# Patient Record
Sex: FEMALE | Race: BLACK OR AFRICAN AMERICAN | NOT HISPANIC OR LATINO | Employment: UNEMPLOYED | URBAN - METROPOLITAN AREA
[De-identification: names, ages, dates, MRNs, and addresses within clinical notes are randomized per-mention and may not be internally consistent; named-entity substitution may affect disease eponyms.]

---

## 2019-09-23 DIAGNOSIS — R74.02 NONSPECIFIC ELEVATION OF LEVELS OF TRANSAMINASE OR LACTIC ACID DEHYDROGENASE (LDH): Primary | ICD-10-CM

## 2019-09-23 DIAGNOSIS — R74.01 NONSPECIFIC ELEVATION OF LEVELS OF TRANSAMINASE OR LACTIC ACID DEHYDROGENASE (LDH): Primary | ICD-10-CM

## 2019-09-23 DIAGNOSIS — Z12.39 SCREENING BREAST EXAMINATION: Primary | ICD-10-CM

## 2019-10-01 ENCOUNTER — HOSPITAL ENCOUNTER (OUTPATIENT)
Dept: RADIOLOGY | Facility: HOSPITAL | Age: 59
Discharge: HOME | End: 2019-10-01
Attending: FAMILY MEDICINE

## 2019-10-01 VITALS — WEIGHT: 220 LBS | HEIGHT: 66 IN | BODY MASS INDEX: 35.36 KG/M2

## 2019-10-01 DIAGNOSIS — R74.02 NONSPECIFIC ELEVATION OF LEVELS OF TRANSAMINASE OR LACTIC ACID DEHYDROGENASE (LDH): ICD-10-CM

## 2019-10-01 DIAGNOSIS — Z12.39 SCREENING BREAST EXAMINATION: ICD-10-CM

## 2019-10-01 DIAGNOSIS — R74.01 NONSPECIFIC ELEVATION OF LEVELS OF TRANSAMINASE OR LACTIC ACID DEHYDROGENASE (LDH): ICD-10-CM

## 2019-10-01 PROCEDURE — 76705 ECHO EXAM OF ABDOMEN: CPT | Mod: TC,PO

## 2019-10-01 PROCEDURE — 77067 SCR MAMMO BI INCL CAD: CPT | Mod: TC,PO

## 2019-10-04 DIAGNOSIS — R92.8 ABNORMAL MAMMOGRAM: Primary | ICD-10-CM

## 2019-10-07 ENCOUNTER — HOSPITAL ENCOUNTER (OUTPATIENT)
Dept: RADIOLOGY | Facility: HOSPITAL | Age: 59
Discharge: HOME | End: 2019-10-07
Attending: FAMILY MEDICINE

## 2019-10-07 DIAGNOSIS — R92.8 ABNORMAL MAMMOGRAM: ICD-10-CM

## 2019-10-07 PROCEDURE — 76642 ULTRASOUND BREAST LIMITED: CPT | Mod: TC,PO,RT

## 2019-11-11 ENCOUNTER — OFFICE VISIT (OUTPATIENT)
Dept: SURGERY | Facility: CLINIC | Age: 59
End: 2019-11-11
Payer: MEDICAID

## 2019-11-11 VITALS
WEIGHT: 220 LBS | TEMPERATURE: 98 F | HEIGHT: 66 IN | SYSTOLIC BLOOD PRESSURE: 125 MMHG | HEART RATE: 101 BPM | BODY MASS INDEX: 35.36 KG/M2 | DIASTOLIC BLOOD PRESSURE: 77 MMHG

## 2019-11-11 DIAGNOSIS — R92.8 ABNORMAL MAMMOGRAM OF RIGHT BREAST: Primary | ICD-10-CM

## 2019-11-11 PROCEDURE — 99213 OFFICE O/P EST LOW 20 MIN: CPT | Performed by: SURGERY

## 2019-11-11 PROCEDURE — 99203 PR OFFICE/OUTPT VISIT, NEW, LEVL III, 30-44 MIN: ICD-10-PCS | Mod: S$PBB,,, | Performed by: SURGERY

## 2019-11-11 PROCEDURE — 99203 OFFICE O/P NEW LOW 30 MIN: CPT | Mod: S$PBB,,, | Performed by: SURGERY

## 2019-11-11 RX ORDER — ROSUVASTATIN CALCIUM 10 MG/1
1 TABLET, COATED ORAL DAILY
Refills: 3 | COMMUNITY
Start: 2019-09-14

## 2019-11-11 RX ORDER — FLUOXETINE HYDROCHLORIDE 40 MG/1
1 CAPSULE ORAL DAILY
COMMUNITY

## 2019-11-11 RX ORDER — ALBUTEROL SULFATE 90 UG/1
2 AEROSOL, METERED RESPIRATORY (INHALATION) EVERY 4 HOURS PRN
COMMUNITY
Start: 2015-04-01

## 2019-11-11 RX ORDER — HYDROCHLOROTHIAZIDE 25 MG/1
1 TABLET ORAL DAILY
Refills: 3 | Status: ON HOLD | COMMUNITY
Start: 2019-09-09 | End: 2022-01-16 | Stop reason: HOSPADM

## 2019-11-11 RX ORDER — METFORMIN HYDROCHLORIDE 1000 MG/1
1 TABLET ORAL NIGHTLY
Refills: 3 | COMMUNITY
Start: 2019-09-14

## 2019-11-11 RX ORDER — LORAZEPAM 0.5 MG/1
0.5 TABLET ORAL 2 TIMES DAILY PRN
Refills: 3 | Status: ON HOLD | COMMUNITY
Start: 2019-10-23 | End: 2022-01-16 | Stop reason: HOSPADM

## 2019-11-11 RX ORDER — AMLODIPINE BESYLATE 10 MG/1
10 TABLET ORAL DAILY
COMMUNITY

## 2019-11-11 RX ORDER — OMEPRAZOLE 40 MG/1
1 CAPSULE, DELAYED RELEASE ORAL
Refills: 3 | COMMUNITY
Start: 2019-09-14

## 2019-11-11 NOTE — PROGRESS NOTES
Subjective:       Patient ID: Claire Mcnamara is a 59 y.o. female.    Chief Complaint: Consult ([facility] referred right breast bx )      HPI:  Patient presents for evaluation of nonpalpable right breast mass.  Mass was initially noted on screening mammogram and confirmed on ultrasound.  It is 1.6 cm in size and is interpreted as highly suspicious.  BI-RADS 5.    Patient never had breast issues in the past.  Patient has very strong family history of breast cancer.  She has multiple sisters to have breast cancer.  Patient has had children.  She is postmenopausal.  She is not taking hormone replacement.    Past Medical History:   Diagnosis Date    Anxiety     Depression     Diabetes mellitus, type 2     GERD (gastroesophageal reflux disease)     Hyperlipidemia     Hypertension      History reviewed. No pertinent surgical history.  Review of patient's allergies indicates:   Allergen Reactions    Amoxicillin Nausea Only and Other (See Comments)     Medication List with Changes/Refills   Current Medications    ALBUTEROL (PROVENTIL HFA) 90 MCG/ACTUATION INHALER    Proventil HFA 90 mcg/actuation aerosol inhaler   Inhale 2 puffs every 4 hours by inhalation route as needed for SOB for 30 days.    AMLODIPINE (NORVASC) 10 MG TABLET    Take 10 mg by mouth once daily.    FLUOXETINE 40 MG CAPSULE    Take 1 capsule by mouth once daily.    HYDROCHLOROTHIAZIDE (HYDRODIURIL) 25 MG TABLET    Take 1 tablet by mouth once daily.    LORAZEPAM (ATIVAN) 0.5 MG TABLET    Take 1 tablet by mouth 2 (two) times daily as needed.    METFORMIN (GLUCOPHAGE) 1000 MG TABLET    Take 1 tablet by mouth every evening.    OMEPRAZOLE (PRILOSEC) 40 MG CAPSULE    Take 1 capsule by mouth before breakfast.    ROSUVASTATIN (CRESTOR) 10 MG TABLET    Take 1 tablet by mouth once daily.     Family History   Problem Relation Age of Onset    Breast cancer Sister     Breast cancer Maternal Aunt     Breast cancer Other     Pancreatic cancer Mother      Social  History     Socioeconomic History    Marital status:      Spouse name: Not on file    Number of children: Not on file    Years of education: Not on file    Highest education level: Not on file   Occupational History    Not on file   Social Needs    Financial resource strain: Not on file    Food insecurity:     Worry: Not on file     Inability: Not on file    Transportation needs:     Medical: Not on file     Non-medical: Not on file   Tobacco Use    Smoking status: Current Some Day Smoker     Types: Cigarettes    Smokeless tobacco: Never Used   Substance and Sexual Activity    Alcohol use: Yes     Comment: Socially     Drug use: Never    Sexual activity: Not on file   Lifestyle    Physical activity:     Days per week: Not on file     Minutes per session: Not on file    Stress: Not on file   Relationships    Social connections:     Talks on phone: Not on file     Gets together: Not on file     Attends Orthodox service: Not on file     Active member of club or organization: Not on file     Attends meetings of clubs or organizations: Not on file     Relationship status: Not on file   Other Topics Concern    Not on file   Social History Narrative    Not on file         Review of Systems   Constitutional: Negative for appetite change, chills, fever and unexpected weight change.   HENT: Negative for hearing loss, rhinorrhea, sore throat and voice change.    Eyes: Negative for photophobia and visual disturbance.   Respiratory: Negative for cough, choking and shortness of breath.    Cardiovascular: Negative for chest pain, palpitations and leg swelling.   Gastrointestinal: Negative for abdominal pain, blood in stool, constipation, diarrhea, nausea and vomiting.   Endocrine: Negative for cold intolerance, heat intolerance, polydipsia and polyuria.   Musculoskeletal: Negative for arthralgias, back pain, joint swelling and neck stiffness.   Skin: Negative for color change, pallor and rash.    Neurological: Negative for dizziness, seizures, syncope and headaches.   Hematological: Negative for adenopathy. Does not bruise/bleed easily.   Psychiatric/Behavioral: Negative for agitation, behavioral problems and confusion.       Objective:      Physical Exam   Constitutional: She appears well-developed and well-nourished.  Non-toxic appearance. No distress.   HENT:   Head: Normocephalic and atraumatic. Head is without abrasion and without laceration.   Right Ear: External ear normal.   Left Ear: External ear normal.   Nose: Nose normal.   Mouth/Throat: Oropharynx is clear and moist.   Eyes: Pupils are equal, round, and reactive to light. EOM are normal.   Neck: Trachea normal. No tracheal deviation and normal range of motion present. No thyroid mass and no thyromegaly present.   Cardiovascular: Normal rate and regular rhythm.   Pulmonary/Chest: Effort normal. No accessory muscle usage. No tachypnea. No respiratory distress. Right breast exhibits no inverted nipple, no mass and no skin change. Left breast exhibits no inverted nipple, no mass and no skin change. No breast tenderness or discharge. Breasts are symmetrical.   Abdominal: Soft. Normal appearance and bowel sounds are normal. She exhibits no distension and no mass. There is no hepatosplenomegaly. There is no tenderness. There is no tenderness at McBurney's point and negative Ying's sign. No hernia.   Lymphadenopathy:     She has no cervical adenopathy.     She has no axillary adenopathy.        Right: No inguinal adenopathy present.        Left: No inguinal adenopathy present.   Neurological: She is alert. Coordination and gait normal.   Skin: Skin is warm and intact.   Psychiatric: She has a normal mood and affect. Her speech is normal and behavior is normal.       Assessment/Plan:   Abnormal mammogram of right breast  -     US Breast Biopsy with Imaging 1st site Right; Future        Impression and Treatment Plan:  Suspicious right breast mass.   Mildly enlarged nodes seen in the right axilla although I cannot feel them clinically.  Biopsy has been ordered.  We will base further decision making on biopsy results.  Very suspicious case.          Follow up in about 2 weeks (around 11/25/2019).

## 2019-11-11 NOTE — LETTER
November 11, 2019      Luis Manuel Montalvo DO  [address]  [facility]  [place] [address]           [facility]  [address]  [place] [address]  Phone: [phone]  Fax: [phone]          Patient: Claire Mcnamara   MR Number: [id number]   YOB: 1960   Date of Visit: 11/11/2019       Dear Dr. Luis Manuel Montalvo:    Thank you for referring Claire Mcnamara to me for evaluation. Attached you will find relevant portions of my assessment and plan of care.    If you have questions, please do not hesitate to call me. I look forward to following Claire Mcnamara along with you.    Sincerely,    Luis Manuel Rios MD    Enclosure  CC:  No Recipients    If you would like to receive this communication electronically, please contact [email or web address] or [phone] to request more information on [employer] Link access.    For providers and/or their staff who would like to refer a patient to [facility], please contact us through our one-stop-shop provider referral line, [facility], at [phone].    If you feel you have received this communication in error or would no longer like to receive these types of communications, please e-mail [email or web address]

## 2019-11-20 ENCOUNTER — HOSPITAL ENCOUNTER (OUTPATIENT)
Dept: RADIOLOGY | Facility: HOSPITAL | Age: 59
Discharge: HOME | End: 2019-11-20
Attending: SURGERY
Payer: MEDICAID

## 2019-11-20 DIAGNOSIS — R92.8 ABNORMAL MAMMOGRAM OF RIGHT BREAST: ICD-10-CM

## 2019-11-20 PROCEDURE — 27000342 US BREAST BIOPSY WITH IMAGING 1ST SITE RIGHT: Mod: PO

## 2019-11-20 NOTE — PLAN OF CARE
Procedure completed. Pt davy well.  Band aid to right breast.  No bleeding or swelling noted.  D/c instructions given, verbalized understanding. Pt amb out of dept

## 2019-12-06 ENCOUNTER — OFFICE VISIT (OUTPATIENT)
Dept: SURGERY | Facility: CLINIC | Age: 59
End: 2019-12-06
Payer: MEDICAID

## 2019-12-06 VITALS
DIASTOLIC BLOOD PRESSURE: 98 MMHG | HEART RATE: 90 BPM | SYSTOLIC BLOOD PRESSURE: 162 MMHG | WEIGHT: 220 LBS | TEMPERATURE: 98 F | BODY MASS INDEX: 35.36 KG/M2 | HEIGHT: 66 IN

## 2019-12-06 DIAGNOSIS — C50.211 MALIGNANT NEOPLASM OF UPPER-INNER QUADRANT OF RIGHT FEMALE BREAST, UNSPECIFIED ESTROGEN RECEPTOR STATUS: Primary | ICD-10-CM

## 2019-12-06 PROCEDURE — 99214 PR OFFICE/OUTPT VISIT, EST, LEVL IV, 30-39 MIN: ICD-10-PCS | Mod: S$PBB,,, | Performed by: SURGERY

## 2019-12-06 PROCEDURE — 99213 OFFICE O/P EST LOW 20 MIN: CPT | Performed by: SURGERY

## 2019-12-06 PROCEDURE — 99214 OFFICE O/P EST MOD 30 MIN: CPT | Mod: S$PBB,,, | Performed by: SURGERY

## 2019-12-06 NOTE — PROGRESS NOTES
Patient presents to discuss results of her right breast biopsy.  Unfortunately came back positive for breast cancer.  Treatment and staging of breast cancer along with options of mastectomy versus breast conservation discussed with patient in detail.  At this time patient is leaning towards breast conservation therapy.  We are going to get a preoperative oncology consultation and see patient back in 2 weeks at which time we will continue our discussion and decide definitively on our surgical approach.  Breast packet given to the patient.

## 2019-12-06 NOTE — LETTER
December 6, 2019      Luis Manuel Montalvo DO  [address]  [facility]  [place] [address]           [facility]  [address]  [place] [address]  Phone: [phone]  Fax: [phone]          Patient: Claire Mcnamara   MR Number: [id number]   YOB: 1960   Date of Visit: 12/6/2019       Dear Dr. Luis Manuel Montalvo:    Thank you for referring Claire Mcnamara to me for evaluation. Attached you will find relevant portions of my assessment and plan of care.    If you have questions, please do not hesitate to call me. I look forward to following Claire Mcnamara along with you.    Sincerely,    Luis Manuel Rios MD    Enclosure  CC:  No Recipients    If you would like to receive this communication electronically, please contact [email or web address] or [phone] to request more information on "[employer] Link access.    For providers and/or their staff who would like to refer a patient to [facility], please contact us through our one-stop-shop provider referral line, [facility], at [phone].    If you feel you have received this communication in error or would no longer like to receive these types of communications, please e-mail [email or web address]

## 2019-12-19 PROBLEM — C50.111 MALIGNANT NEOPLASM OF CENTRAL PORTION OF RIGHT BREAST IN FEMALE, ESTROGEN RECEPTOR POSITIVE: Status: ACTIVE | Noted: 2019-12-19

## 2019-12-19 PROBLEM — Z17.0 MALIGNANT NEOPLASM OF CENTRAL PORTION OF RIGHT BREAST IN FEMALE, ESTROGEN RECEPTOR POSITIVE: Status: ACTIVE | Noted: 2019-12-19

## 2019-12-20 ENCOUNTER — OFFICE VISIT (OUTPATIENT)
Dept: HEMATOLOGY/ONCOLOGY | Facility: CLINIC | Age: 59
End: 2019-12-20
Payer: MEDICAID

## 2019-12-20 VITALS
HEART RATE: 92 BPM | DIASTOLIC BLOOD PRESSURE: 90 MMHG | TEMPERATURE: 98 F | SYSTOLIC BLOOD PRESSURE: 148 MMHG | HEIGHT: 67 IN | WEIGHT: 205.19 LBS | BODY MASS INDEX: 32.2 KG/M2

## 2019-12-20 DIAGNOSIS — Z17.0 MALIGNANT NEOPLASM OF CENTRAL PORTION OF RIGHT BREAST IN FEMALE, ESTROGEN RECEPTOR POSITIVE: ICD-10-CM

## 2019-12-20 DIAGNOSIS — C50.111 MALIGNANT NEOPLASM OF CENTRAL PORTION OF RIGHT BREAST IN FEMALE, ESTROGEN RECEPTOR POSITIVE: ICD-10-CM

## 2019-12-20 PROCEDURE — 99205 PR OFFICE/OUTPT VISIT, NEW, LEVL V, 60-74 MIN: ICD-10-PCS | Mod: S$GLB,,, | Performed by: INTERNAL MEDICINE

## 2019-12-20 PROCEDURE — 99205 OFFICE O/P NEW HI 60 MIN: CPT | Mod: S$GLB,,, | Performed by: INTERNAL MEDICINE

## 2019-12-20 NOTE — PROGRESS NOTES
INITIAL [facility] HEM/ONC CONSULTATION      Subjective:       Patient ID: Claire Mcnamara is a 59 y.o. female.    Chief Complaint: No chief complaint on file.      Patient is a 58yo female who was found to have an abnormality on screening mammogram which showed a 17mm mass in the right breast. Biopsy has been done which shows an ER/VA, HEr2 negative cancer.  Patient has not had surgery and is here for recommendations.      10/7/2019 mammogram:  17mm spiculated mass in right breast along the nipple line.     11/20/2019 Diagnosis via needle biopsy  Grade I, IDCA  ER: 99%, VA: 95%, Her 2 negative    Past Medical History:   Diagnosis Date    Anxiety     Depression     Diabetes mellitus, type 2     GERD (gastroesophageal reflux disease)     Hyperlipidemia     Hypertension        History reviewed. No pertinent surgical history.    Social History     Socioeconomic History    Marital status:      Spouse name: Not on file    Number of children: Not on file    Years of education: Not on file    Highest education level: Not on file   Occupational History    Not on file   Social Needs    Financial resource strain: Not on file    Food insecurity:     Worry: Not on file     Inability: Not on file    Transportation needs:     Medical: Not on file     Non-medical: Not on file   Tobacco Use    Smoking status: Current Some Day Smoker     Types: Cigarettes    Smokeless tobacco: Never Used   Substance and Sexual Activity    Alcohol use: Yes     Comment: Socially     Drug use: Never    Sexual activity: Not on file   Lifestyle    Physical activity:     Days per week: Not on file     Minutes per session: Not on file    Stress: Not on file   Relationships    Social connections:     Talks on phone: Not on file     Gets together: Not on file     Attends Restoration service: Not on file     Active member of club or organization: Not on file     Attends meetings of clubs or organizations: Not on file     Relationship  status: Not on file   Other Topics Concern    Not on file   Social History Narrative    Not on file       Family History   Problem Relation Age of Onset    Breast cancer Sister     Breast cancer Maternal Aunt     Breast cancer Other     Pancreatic cancer Mother        Review of patient's allergies indicates:   Allergen Reactions    Amoxicillin Nausea Only and Other (See Comments)       Current Outpatient Medications:     albuterol (PROVENTIL HFA) 90 mcg/actuation inhaler, Proventil HFA 90 mcg/actuation aerosol inhaler  Inhale 2 puffs every 4 hours by inhalation route as needed for SOB for 30 days., Disp: , Rfl:     amLODIPine (NORVASC) 10 MG tablet, Take 10 mg by mouth once daily., Disp: , Rfl:     FLUoxetine 40 MG capsule, Take 1 capsule by mouth once daily., Disp: , Rfl:     hydroCHLOROthiazide (HYDRODIURIL) 25 MG tablet, Take 1 tablet by mouth once daily., Disp: , Rfl: 3    LORazepam (ATIVAN) 0.5 MG tablet, Take 1 tablet by mouth 2 (two) times daily as needed., Disp: , Rfl: 3    metFORMIN (GLUCOPHAGE) 1000 MG tablet, Take 1 tablet by mouth every evening., Disp: , Rfl: 3    omeprazole (PRILOSEC) 40 MG capsule, Take 1 capsule by mouth before breakfast., Disp: , Rfl: 3    rosuvastatin (CRESTOR) 10 MG tablet, Take 1 tablet by mouth once daily., Disp: , Rfl: 3    All medications and past history have been reviewed.    Review of Systems   Constitutional: Negative for activity change, appetite change, diaphoresis, fatigue, fever and unexpected weight change.   HENT: Negative for congestion and hearing loss.    Eyes: Negative for visual disturbance.   Respiratory: Negative for cough, chest tightness and shortness of breath.    Cardiovascular: Negative for chest pain and leg swelling.   Gastrointestinal: Negative for abdominal pain, blood in stool, diarrhea, nausea and vomiting.   Endocrine: Negative for cold intolerance and heat intolerance.   Genitourinary: Negative for difficulty urinating, dysuria and  "hematuria.   Neurological: Negative for dizziness and headaches.   Hematological: Negative for adenopathy. Does not bruise/bleed easily.   Psychiatric/Behavioral: Negative for behavioral problems.       Objective:        BP (!) 148/90   Pulse 92   Temp 98.1 °F (36.7 °C) (Oral)   Resp (!) 0   Ht 5' 6.5" (1.689 m)   Wt 93.1 kg (205 lb 3.2 oz)   BMI 32.62 kg/m²     Physical Exam   Constitutional: She appears well-developed and well-nourished.   HENT:   Head: Normocephalic and atraumatic.   Right Ear: External ear normal.   Left Ear: External ear normal.   Mouth/Throat: Oropharynx is clear and moist.   Eyes: Pupils are equal, round, and reactive to light. Conjunctivae are normal.   Neck: No tracheal deviation present. No thyromegaly present.   Cardiovascular: Normal rate, regular rhythm and normal heart sounds.   Pulmonary/Chest: Effort normal and breath sounds normal.   Abdominal: Soft. Bowel sounds are normal. She exhibits no distension and no mass. There is no tenderness.   Musculoskeletal: She exhibits no edema.   Neurological:   Neuro intact througout   Skin: No rash noted.   Psychiatric: She has a normal mood and affect. Her behavior is normal. Judgment and thought content normal.         Lab  No results found for this or any previous visit (from the past 336 hour(s)).  CMP  Sodium   Date Value Ref Range Status   09/15/2008 136 136 - 145 mMol/l Final     Potassium   Date Value Ref Range Status   09/15/2008 3.9 3.5 - 5.1 mMol/l Final     Chloride   Date Value Ref Range Status   09/15/2008 100 95 - 110 mMol/l Final     CO2   Date Value Ref Range Status   09/15/2008 23 23.0 - 29.0 mEq/L Final     Glucose   Date Value Ref Range Status   09/15/2008 92 70 - 110 mg/dl Final     BUN, Bld   Date Value Ref Range Status   09/15/2008 10 6 - 20 mg/dl Final     Creatinine   Date Value Ref Range Status   09/15/2008 0.7 0.5 - 1.4 mg/dl Final     Calcium   Date Value Ref Range Status   09/15/2008 9.8 8.7 - 10.5 mg/dl Final "     Total Protein   Date Value Ref Range Status   09/15/2008 8.0 6.0 - 8.4 gm/dl Final     Albumin   Date Value Ref Range Status   09/15/2008 4.8 3.5 - 5.2 g/dl Final     Total Bilirubin   Date Value Ref Range Status   09/15/2008 0.5 0.1 - 1.0 mg/dl Final     Comment:     For infants and newborns, interpretation of results should be based  on gestational age, weight and in agreement with clinical  observations.  .  Premature Infant recommended reference ranges:  Up to 24 hours.............<8.0 mg/dl  Up to 48 hours............<12.0 mg/dl  3-5 days..................<15.0 mg/dl  6-29 days.................<15.0 mg/dl       Alkaline Phosphatase   Date Value Ref Range Status   09/15/2008 78 55 - 135 U/L Final     AST   Date Value Ref Range Status   09/15/2008 19 10 - 40 U/L Final     ALT   Date Value Ref Range Status   09/15/2008 24 10 - 44 U/L Final         Specimen (12h ago, onward)    None                All lab results and imaging results have been reviewed and discussed with the patient.     Assessment:       1. Malignant neoplasm of central portion of right breast in female, estrogen receptor positive      Problem List Items Addressed This Visit     Malignant neoplasm of central portion of right breast in female, estrogen receptor positive     I had a long discussion with ms. Mcnamara about this cancer and detailed the pathology report and discussed the logic behind decision making in her care.  I discussed that at this point I would recommend she proceed with surgery and can get lump/sln and radiation or mastectomy.  I discussed these surgeries in detail with her and that her path dictates she will need antiestrogen therapy.  I cannot yet make a decision regarding chemotherapy as she will likely need oncotype dx testing done and this was discussed as well.  I will have her back with me after surgery to further discuss.               Cancer Staging  No matching staging information was found for the patient.      Plan:          Follow up in about 4 weeks (around 1/17/2020).       The plan was discussed with the patient and all questions/concerns have been answered to the patient's satisfaction.

## 2019-12-20 NOTE — ASSESSMENT & PLAN NOTE
I had a long discussion with ms. Mcnamara about this cancer and detailed the pathology report and discussed the logic behind decision making in her care.  I discussed that at this point I would recommend she proceed with surgery and can get lump/sln and radiation or mastectomy.  I discussed these surgeries in detail with her and that her path dictates she will need antiestrogen therapy.  I cannot yet make a decision regarding chemotherapy as she will likely need oncotype dx testing done and this was discussed as well.  I will have her back with me after surgery to further discuss.

## 2019-12-23 ENCOUNTER — HOSPITAL ENCOUNTER (OUTPATIENT)
Dept: PREADMISSION TESTING | Facility: HOSPITAL | Age: 59
Discharge: HOME | End: 2019-12-23
Attending: SURGERY
Payer: MEDICAID

## 2019-12-23 ENCOUNTER — HOSPITAL ENCOUNTER (OUTPATIENT)
Dept: RADIOLOGY | Facility: HOSPITAL | Age: 59
Discharge: HOME | End: 2019-12-23
Attending: SURGERY
Payer: MEDICAID

## 2019-12-23 ENCOUNTER — OFFICE VISIT (OUTPATIENT)
Dept: SURGERY | Facility: CLINIC | Age: 59
End: 2019-12-23
Payer: MEDICAID

## 2019-12-23 VITALS
DIASTOLIC BLOOD PRESSURE: 92 MMHG | TEMPERATURE: 99 F | SYSTOLIC BLOOD PRESSURE: 147 MMHG | RESPIRATION RATE: 16 BRPM | HEART RATE: 90 BPM | WEIGHT: 202.56 LBS | HEIGHT: 66 IN | OXYGEN SATURATION: 97 % | BODY MASS INDEX: 32.55 KG/M2

## 2019-12-23 VITALS
DIASTOLIC BLOOD PRESSURE: 99 MMHG | TEMPERATURE: 98 F | WEIGHT: 205 LBS | HEIGHT: 67 IN | SYSTOLIC BLOOD PRESSURE: 157 MMHG | BODY MASS INDEX: 32.18 KG/M2 | HEART RATE: 101 BPM

## 2019-12-23 DIAGNOSIS — C50.211 MALIGNANT NEOPLASM OF UPPER-INNER QUADRANT OF RIGHT FEMALE BREAST, UNSPECIFIED ESTROGEN RECEPTOR STATUS: ICD-10-CM

## 2019-12-23 DIAGNOSIS — C50.211 MALIGNANT NEOPLASM OF UPPER-INNER QUADRANT OF RIGHT FEMALE BREAST, UNSPECIFIED ESTROGEN RECEPTOR STATUS: Primary | ICD-10-CM

## 2019-12-23 PROCEDURE — 93005 ELECTROCARDIOGRAM TRACING: CPT

## 2019-12-23 PROCEDURE — 99213 PR OFFICE/OUTPT VISIT, EST, LEVL III, 20-29 MIN: ICD-10-PCS | Mod: S$PBB,,, | Performed by: SURGERY

## 2019-12-23 PROCEDURE — 71046 X-RAY EXAM CHEST 2 VIEWS: CPT | Mod: TC

## 2019-12-23 PROCEDURE — 99215 OFFICE O/P EST HI 40 MIN: CPT | Mod: 25 | Performed by: SURGERY

## 2019-12-23 PROCEDURE — 99213 OFFICE O/P EST LOW 20 MIN: CPT | Mod: S$PBB,,, | Performed by: SURGERY

## 2019-12-23 RX ORDER — SODIUM CHLORIDE 9 MG/ML
INJECTION, SOLUTION INTRAVENOUS CONTINUOUS
Status: CANCELLED | OUTPATIENT
Start: 2019-12-23

## 2019-12-23 RX ORDER — LIDOCAINE HYDROCHLORIDE 10 MG/ML
1 INJECTION, SOLUTION EPIDURAL; INFILTRATION; INTRACAUDAL; PERINEURAL ONCE
Status: DISCONTINUED | OUTPATIENT
Start: 2019-12-23 | End: 2022-01-16 | Stop reason: HOSPADM

## 2019-12-23 RX ORDER — MONTELUKAST SODIUM 10 MG/1
10 TABLET ORAL DAILY
COMMUNITY
Start: 2019-12-08

## 2019-12-23 RX ORDER — CHOLECALCIFEROL (VITAMIN D3) 25 MCG
1000 TABLET ORAL DAILY
COMMUNITY

## 2019-12-23 RX ORDER — ASCORBIC ACID 500 MG
500 TABLET ORAL DAILY
COMMUNITY
End: 2022-01-12

## 2019-12-23 NOTE — H&P (VIEW-ONLY)
Subjective:       Patient ID: Claire Mcnamara is a 59 y.o. female.    Chief Complaint: Other (FU BR CA Discussion)      HPI:  Patient with biopsy-proven cancer in the right breast presents for definitive treatment.  The tumor measures 1.6 cm.  Is not palpable.  Preoperative oncology consultation has been completed.  Patient would like to proceed with breast preservation therapy.    Past Medical History:   Diagnosis Date    Anxiety     Depression     Diabetes mellitus, type 2     GERD (gastroesophageal reflux disease)     Hyperlipidemia     Hypertension      History reviewed. No pertinent surgical history.  Review of patient's allergies indicates:   Allergen Reactions    Amoxicillin Nausea Only and Other (See Comments)     Medication List with Changes/Refills   Current Medications    ALBUTEROL (PROVENTIL HFA) 90 MCG/ACTUATION INHALER    Proventil HFA 90 mcg/actuation aerosol inhaler   Inhale 2 puffs every 4 hours by inhalation route as needed for SOB for 30 days.    AMLODIPINE (NORVASC) 10 MG TABLET    Take 10 mg by mouth once daily.    FLUOXETINE 40 MG CAPSULE    Take 1 capsule by mouth once daily.    HYDROCHLOROTHIAZIDE (HYDRODIURIL) 25 MG TABLET    Take 1 tablet by mouth once daily.    LORAZEPAM (ATIVAN) 0.5 MG TABLET    Take 1 tablet by mouth 2 (two) times daily as needed.    METFORMIN (GLUCOPHAGE) 1000 MG TABLET    Take 1 tablet by mouth every evening.    MONTELUKAST (SINGULAIR) 10 MG TABLET        OMEPRAZOLE (PRILOSEC) 40 MG CAPSULE    Take 1 capsule by mouth before breakfast.    ROSUVASTATIN (CRESTOR) 10 MG TABLET    Take 1 tablet by mouth once daily.     Family History   Problem Relation Age of Onset    Breast cancer Sister     Breast cancer Maternal Aunt     Breast cancer Other     Pancreatic cancer Mother      Social History     Socioeconomic History    Marital status:      Spouse name: Not on file    Number of children: Not on file    Years of education: Not on file    Highest  education level: Not on file   Occupational History    Not on file   Social Needs    Financial resource strain: Not on file    Food insecurity:     Worry: Not on file     Inability: Not on file    Transportation needs:     Medical: Not on file     Non-medical: Not on file   Tobacco Use    Smoking status: Current Some Day Smoker     Types: Cigarettes    Smokeless tobacco: Never Used   Substance and Sexual Activity    Alcohol use: Yes     Comment: Socially     Drug use: Never    Sexual activity: Not on file   Lifestyle    Physical activity:     Days per week: Not on file     Minutes per session: Not on file    Stress: Not on file   Relationships    Social connections:     Talks on phone: Not on file     Gets together: Not on file     Attends Taoist service: Not on file     Active member of club or organization: Not on file     Attends meetings of clubs or organizations: Not on file     Relationship status: Not on file   Other Topics Concern    Not on file   Social History Narrative    Not on file         Review of Systems    Objective:      Physical Exam   Constitutional: She appears well-developed and well-nourished.  Non-toxic appearance. No distress.   HENT:   Head: Normocephalic and atraumatic. Head is without abrasion and without laceration.   Right Ear: External ear normal.   Left Ear: External ear normal.   Nose: Nose normal.   Mouth/Throat: Oropharynx is clear and moist.   Eyes: Pupils are equal, round, and reactive to light. EOM are normal.   Neck: Trachea normal. No tracheal deviation and normal range of motion present. No thyroid mass and no thyromegaly present.   Cardiovascular: Normal rate and regular rhythm.   Pulmonary/Chest: Effort normal. No accessory muscle usage. No tachypnea. No respiratory distress. Right breast exhibits no inverted nipple, no mass and no skin change. Left breast exhibits no inverted nipple, no mass and no skin change. No breast tenderness or discharge. Breasts  are symmetrical.   Abdominal: Soft. Normal appearance and bowel sounds are normal. She exhibits no distension and no mass. There is no hepatosplenomegaly. There is no tenderness. There is no tenderness at McBurney's point and negative Ying's sign. No hernia.   Genitourinary: No breast tenderness or discharge.   Lymphadenopathy:     She has no cervical adenopathy.     She has no axillary adenopathy.        Right: No inguinal adenopathy present.        Left: No inguinal adenopathy present.   Neurological: She is alert. Coordination and gait normal.   Skin: Skin is warm and intact.   Psychiatric: She has a normal mood and affect. Her speech is normal and behavior is normal.       Assessment/Plan:   Malignant neoplasm of upper-inner quadrant of right female breast, unspecified estrogen receptor status  -     Full code; Standing  -     Insert peripheral IV; Standing  -     Comprehensive metabolic panel; Future; Expected date: 12/23/2019  -     CBC auto differential; Future; Expected date: 12/23/2019  -     EKG 12-lead; Future  -     X-Ray Chest 1 View; Future; Expected date: 12/23/2019  -     Case Request Operating Room: LUMPECTOMY, BREAST, BIOPSY, LYMPH NODE, SENTINEL  -     US Needle Loc with Ultrasound 1st site; Future  -     NM [place] Lymph Node Injection Only_Rad Performed; Future    Other orders  -     lidocaine (PF) 10 mg/ml (1%) injection 10 mg        Impression and Treatment Plan:  Right breast cancer.  Clinical stage T1 N0 M0.  Plan:  right lumpectomy with ultrasound localization.  Right sentinel node biopsy.  Possible right axillary node dissection.          I discussed the proposed procedures the the patient including risks, benefits, indications, alternatives and special concerns.  The patient appears to understand and agrees to go ahead with surgery.  I have made no promises, warranties or verbal agreements beyond what was discussed above.

## 2019-12-23 NOTE — DISCHARGE INSTRUCTIONS
Incision Care  Remember: Follow-up visits allow your healthcare provider to make sure your incision is healing well. Be sure to keep your appointments.     Stitches (sutures), surgical staples, special strips of surgical tape, or surgical skin glue may be used to close incisions. They also help stop bleeding and speed healing. To help your incision heal, follow the tips on this handout.  Home care  Tips for home care include the following:  · Always wash your hands before touching your incision.  · Keep your incision clean and dry.  · Avoid doing things that could cause dirt or sweat to get on your incision.  · Dont pick at scabs. They help protect the wound.  · Keep your incision out of water.  · Take a sponge bath to avoid getting your incision wet, unless your healthcare provider tells you otherwise.  · Ask your provider when can you take a shower or bathe.  · Ask your provider about the best way to keep your incision dry when bathing or showering.  · Pat stitches dry if they get wet. Dont rub.  · Leave the bandage (dressing) in place until you are told to remove it or change it. Change it only as directed, using clean hands.  · After the first 12 hours, change your dressing every 24 hours, or as directed by your healthcare provider.  · Change your dressing if it gets wet or soiled.  Care for specific closures  Follow these guidelines unless your healthcare provider tells you otherwise:  · Stitches or staples. Once you no longer need to keep these dry, clean the wound daily. First remove the bandage using clean hands. Then wash the area gently with soap and warm water. Use a wet cotton swab to loosen and remove any blood or crust that forms. After cleaning, put a thin layer of antibiotic ointment on. Then put on a new bandage.  · Skin glue. Dont put liquid, ointment, or cream on your wound while the glue is in place. Avoid activities that cause heavy sweating. Protect the wound from sunlight. Do not scratch,  rub, or pick at the glue. Do not put tape directly over the glue. The glue should peel off within 5 to 10 days.  · Surgical tape. Keep the area dry. If it gets wet, blot the area dry with a clean towel. Surgical tape usually falls off within 7 to 10 days. If it has not fallen off after 10 days, contact your healthcare provider before taking it off yourself. If you are told to remove the tape, put mineral oil or petroleum jelly on a cotton ball. Gently rub the tape until it is removed.  Changing your dressing  Leave the dressing (bandage) in place until you are told to remove it or change it. Follow the instructions below unless told otherwise by your healthcare provider:  · Always wash your hands before changing your dressing.  · After the first 48 hours, the incision wound usually will have closed. At this point, leave the incision uncovered and open to the air. If the incision has not closed keep it covered.  · Cover your incision only if your clothing is rubbing it or causing irritation.  · Change your dressing if it gets wet or soiled.  Follow-up care  Follow up with your healthcare provider to ask how long sutures or staples should be left in place. Be sure to return for stitch or staple removal as directed. If dissolving stitches were used in your mouth, these will not need to be removed. They should fall out or dissolve on their own.  If tape closures were used, remove them yourself when your provider recommends if they have not fallen off on their own. If skin glue was used, the glue will wear off by itself.  When to seek medical care  Call your healthcare provider if you have any of the following:  · More pain, redness, swelling, bleeding, or foul-smelling discharge around the incision area  · Fever of 100.4°F (38ºC) or higher, or as directed by your healthcare provider  · Shaking chills  · Vomiting or nausea that doesn't go away  · Numbness, coldness, or tingling around the incision area, or changes in  skin color  · Opening of the sutures or wound  · Stitches or staples come apart or fall out or surgical tape falls off before 7 days or as directed by your healthcare provider   Date Last Reviewed: 12/1/2016  © 2735-1834 [employer]. [address] [place] [address], [place] [address]. All rights reserved. This information is not intended as a substitute for professional medical care. Always follow your healthcare professional's instructions.        How to Quit Smoking  Smoking is one of the hardest habits to break. About half of all people who have ever smoked have been able to quit. Most people who still smoke want to quit. Here are some of the best ways to stop smoking.    Keep trying  Most smokers make many attempts at quitting before they are successful. Its important not to give up.  Go cold turkey  Most former smokers quit cold turkey (all at once). Trying to cut back gradually doesn't seem to work as well, perhaps because it continues the smoking habit. Also, it is possible to inhale more while smoking fewer cigarettes. This results in the same amount of nicotine in your body.  Get support  Support programs can be a big help, especially for heavy smokers. These groups offer lectures, ways to change behavior, and peer support. Here are some ways to find a support program:  · Free national quitline: 800-QUIT-NOW (709-837-6319).  · Hospital quit-smoking programs.  · American Lung Association: (271.159.3305).  · American Cancer Society (091-639-6235).  Support at home is important too. Nonsmokers can offer praise and encouragement. If the smoker in your life finds it hard to quit, encourage them to keep trying.  Over-the-counter medicines  Nicotine replacement therapy may make quitting easier. Certain aids, such as the nicotine patch, gum, and lozenges, are available without a prescription. It is best to use these under a doctors care, though. The skin patch provides a steady supply of nicotine. Nicotine  "gum and lozenges give temporary bursts of low levels of nicotine. Both methods reduce the craving for cigarettes. Warning: If you have nausea, vomiting, dizziness, weakness, or a fast heartbeat, stop using these products and see your doctor.  Prescription medicines  After reviewing your smoking patterns and past attempts to quit, your doctor may offer a prescription medicine such as bupropion, varenicline, a nicotine inhaler, or nasal spray. Each has advantages and side effects. Your doctor can review these with you.  Health benefits of quitting  The benefits of quitting start right away and keep improving the longer you go without smoking. These benefits occur at any age.  So whether you are 17 or 70, quitting is a good decision. Some of the benefits include:  · 20 minutes: Blood pressure and pulse return to normal.  · 8 hours: Oxygen levels return to normal.  · 2 days: Ability to smell and taste begin to improve as damaged nerves regrow.  · 2 to 3 weeks: Circulation and lung function improve.  · 1 to 9 months: Coughing, congestion, and shortness of breath decrease; tiredness decreases.  · 1 year: Risk of heart attack decreases by half.  · 5 years: Risk of lung cancer decreases by half; risk of stroke becomes the same as a nonsmokers.  For more on how to quit smoking, try these online resources:   · Smokefree.gov  · "Clearing the Air" booklet from the National Cancer [place]: smokefree.gov/sites/default/files/pdf/clearing-the-air-accessible.pdf  Date Last Reviewed: 3/1/2017  © 3351-9447 [employer] [facility]. [address] [place] [address], [place] [address]. All rights reserved. This information is not intended as a substitute for professional medical care. Always follow your healthcare professional's instructions.        "

## 2019-12-23 NOTE — LETTER
December 23, 2019      Luis Manuel Montalvo DO  [address]  [facility]  [place] [address]           [facility]  [address]  [place] [address]  Phone: [phone]  Fax: [phone]          Patient: Claire Mcnamara   MR Number: [id number]   YOB: 1960   Date of Visit: 12/23/2019       Dear Dr. Luis Manuel Montalvo:    Thank you for referring Claire Mcnamara to me for evaluation. Attached you will find relevant portions of my assessment and plan of care.    If you have questions, please do not hesitate to call me. I look forward to following Claire Mcnamara along with you.    Sincerely,    Luis Manuel Rios MD    Enclosure  CC:  No Recipients    If you would like to receive this communication electronically, please contact [email or web address] or [phone] to request more information on [employer] Link access.    For providers and/or their staff who would like to refer a patient to [facility], please contact us through our one-stop-shop provider referral line, [facility], at [phone].    If you feel you have received this communication in error or would no longer like to receive these types of communications, please e-mail [email or web address]

## 2019-12-23 NOTE — PROGRESS NOTES
Subjective:       Patient ID: Calire Mcnamara is a 59 y.o. female.    Chief Complaint: Other (FU BR CA Discussion)      HPI:  Patient with biopsy-proven cancer in the right breast presents for definitive treatment.  The tumor measures 1.6 cm.  Is not palpable.  Preoperative oncology consultation has been completed.  Patient would like to proceed with breast preservation therapy.    Past Medical History:   Diagnosis Date    Anxiety     Depression     Diabetes mellitus, type 2     GERD (gastroesophageal reflux disease)     Hyperlipidemia     Hypertension      History reviewed. No pertinent surgical history.  Review of patient's allergies indicates:   Allergen Reactions    Amoxicillin Nausea Only and Other (See Comments)     Medication List with Changes/Refills   Current Medications    ALBUTEROL (PROVENTIL HFA) 90 MCG/ACTUATION INHALER    Proventil HFA 90 mcg/actuation aerosol inhaler   Inhale 2 puffs every 4 hours by inhalation route as needed for SOB for 30 days.    AMLODIPINE (NORVASC) 10 MG TABLET    Take 10 mg by mouth once daily.    FLUOXETINE 40 MG CAPSULE    Take 1 capsule by mouth once daily.    HYDROCHLOROTHIAZIDE (HYDRODIURIL) 25 MG TABLET    Take 1 tablet by mouth once daily.    LORAZEPAM (ATIVAN) 0.5 MG TABLET    Take 1 tablet by mouth 2 (two) times daily as needed.    METFORMIN (GLUCOPHAGE) 1000 MG TABLET    Take 1 tablet by mouth every evening.    MONTELUKAST (SINGULAIR) 10 MG TABLET        OMEPRAZOLE (PRILOSEC) 40 MG CAPSULE    Take 1 capsule by mouth before breakfast.    ROSUVASTATIN (CRESTOR) 10 MG TABLET    Take 1 tablet by mouth once daily.     Family History   Problem Relation Age of Onset    Breast cancer Sister     Breast cancer Maternal Aunt     Breast cancer Other     Pancreatic cancer Mother      Social History     Socioeconomic History    Marital status:      Spouse name: Not on file    Number of children: Not on file    Years of education: Not on file    Highest  education level: Not on file   Occupational History    Not on file   Social Needs    Financial resource strain: Not on file    Food insecurity:     Worry: Not on file     Inability: Not on file    Transportation needs:     Medical: Not on file     Non-medical: Not on file   Tobacco Use    Smoking status: Current Some Day Smoker     Types: Cigarettes    Smokeless tobacco: Never Used   Substance and Sexual Activity    Alcohol use: Yes     Comment: Socially     Drug use: Never    Sexual activity: Not on file   Lifestyle    Physical activity:     Days per week: Not on file     Minutes per session: Not on file    Stress: Not on file   Relationships    Social connections:     Talks on phone: Not on file     Gets together: Not on file     Attends Spiritism service: Not on file     Active member of club or organization: Not on file     Attends meetings of clubs or organizations: Not on file     Relationship status: Not on file   Other Topics Concern    Not on file   Social History Narrative    Not on file         Review of Systems    Objective:      Physical Exam   Constitutional: She appears well-developed and well-nourished.  Non-toxic appearance. No distress.   HENT:   Head: Normocephalic and atraumatic. Head is without abrasion and without laceration.   Right Ear: External ear normal.   Left Ear: External ear normal.   Nose: Nose normal.   Mouth/Throat: Oropharynx is clear and moist.   Eyes: Pupils are equal, round, and reactive to light. EOM are normal.   Neck: Trachea normal. No tracheal deviation and normal range of motion present. No thyroid mass and no thyromegaly present.   Cardiovascular: Normal rate and regular rhythm.   Pulmonary/Chest: Effort normal. No accessory muscle usage. No tachypnea. No respiratory distress. Right breast exhibits no inverted nipple, no mass and no skin change. Left breast exhibits no inverted nipple, no mass and no skin change. No breast tenderness or discharge. Breasts  are symmetrical.   Abdominal: Soft. Normal appearance and bowel sounds are normal. She exhibits no distension and no mass. There is no hepatosplenomegaly. There is no tenderness. There is no tenderness at McBurney's point and negative Ying's sign. No hernia.   Genitourinary: No breast tenderness or discharge.   Lymphadenopathy:     She has no cervical adenopathy.     She has no axillary adenopathy.        Right: No inguinal adenopathy present.        Left: No inguinal adenopathy present.   Neurological: She is alert. Coordination and gait normal.   Skin: Skin is warm and intact.   Psychiatric: She has a normal mood and affect. Her speech is normal and behavior is normal.       Assessment/Plan:   Malignant neoplasm of upper-inner quadrant of right female breast, unspecified estrogen receptor status  -     Full code; Standing  -     Insert peripheral IV; Standing  -     Comprehensive metabolic panel; Future; Expected date: 12/23/2019  -     CBC auto differential; Future; Expected date: 12/23/2019  -     EKG 12-lead; Future  -     X-Ray Chest 1 View; Future; Expected date: 12/23/2019  -     Case Request Operating Room: LUMPECTOMY, BREAST, BIOPSY, LYMPH NODE, SENTINEL  -     US Needle Loc with Ultrasound 1st site; Future  -     NM [place] Lymph Node Injection Only_Rad Performed; Future    Other orders  -     lidocaine (PF) 10 mg/ml (1%) injection 10 mg        Impression and Treatment Plan:  Right breast cancer.  Clinical stage T1 N0 M0.  Plan:  right lumpectomy with ultrasound localization.  Right sentinel node biopsy.  Possible right axillary node dissection.          I discussed the proposed procedures the the patient including risks, benefits, indications, alternatives and special concerns.  The patient appears to understand and agrees to go ahead with surgery.  I have made no promises, warranties or verbal agreements beyond what was discussed above.

## 2019-12-26 ENCOUNTER — ANESTHESIA EVENT (OUTPATIENT)
Dept: SURGERY | Facility: HOSPITAL | Age: 59
End: 2019-12-26
Payer: MEDICAID

## 2019-12-26 ENCOUNTER — HOSPITAL ENCOUNTER (OUTPATIENT)
Dept: RADIOLOGY | Facility: HOSPITAL | Age: 59
Discharge: HOME | End: 2019-12-26
Attending: SURGERY | Admitting: SURGERY
Payer: MEDICAID

## 2019-12-26 ENCOUNTER — ANESTHESIA (OUTPATIENT)
Dept: SURGERY | Facility: HOSPITAL | Age: 59
End: 2019-12-26
Payer: MEDICAID

## 2019-12-26 ENCOUNTER — HOSPITAL ENCOUNTER (OUTPATIENT)
Facility: HOSPITAL | Age: 59
Discharge: HOME | End: 2019-12-26
Attending: SURGERY | Admitting: SURGERY
Payer: MEDICAID

## 2019-12-26 VITALS
HEIGHT: 66 IN | HEART RATE: 77 BPM | BODY MASS INDEX: 32.47 KG/M2 | RESPIRATION RATE: 17 BRPM | OXYGEN SATURATION: 99 % | TEMPERATURE: 98 F | DIASTOLIC BLOOD PRESSURE: 70 MMHG | WEIGHT: 202 LBS | SYSTOLIC BLOOD PRESSURE: 128 MMHG

## 2019-12-26 DIAGNOSIS — C50.211 MALIGNANT NEOPLASM OF UPPER-INNER QUADRANT OF RIGHT FEMALE BREAST, UNSPECIFIED ESTROGEN RECEPTOR STATUS: ICD-10-CM

## 2019-12-26 LAB
GLUCOSE SERPL-MCNC: 88 MG/DL (ref 70–110)
GLUCOSE SERPL-MCNC: 89 MG/DL (ref 70–110)

## 2019-12-26 PROCEDURE — 36000707: Performed by: SURGERY

## 2019-12-26 PROCEDURE — 27000671 HC TUBING MICROBORE EXT: Performed by: ANESTHESIOLOGY

## 2019-12-26 PROCEDURE — 19301 PR MASTECTOMY, PARTIAL: ICD-10-PCS | Mod: RT,,, | Performed by: SURGERY

## 2019-12-26 PROCEDURE — 37000009 HC ANESTHESIA EA ADD 15 MINS: Performed by: SURGERY

## 2019-12-26 PROCEDURE — 27000654 HC CATH IV JELCO: Performed by: ANESTHESIOLOGY

## 2019-12-26 PROCEDURE — 71000033 HC RECOVERY, INTIAL HOUR: Performed by: SURGERY

## 2019-12-26 PROCEDURE — 71000016 HC POSTOP RECOV ADDL HR: Performed by: SURGERY

## 2019-12-26 PROCEDURE — 38900 PR INTRAOPERATIVE SENTINEL LYMPH NODE ID W DYE INJECTION: ICD-10-PCS | Mod: RT,,, | Performed by: SURGERY

## 2019-12-26 PROCEDURE — 25000003 PHARM REV CODE 250: Performed by: ANESTHESIOLOGY

## 2019-12-26 PROCEDURE — 38792 RA TRACER ID OF SENTINL NODE: CPT | Mod: TC

## 2019-12-26 PROCEDURE — 63600175 PHARM REV CODE 636 W HCPCS: Performed by: ANESTHESIOLOGY

## 2019-12-26 PROCEDURE — 27201423 OPTIME MED/SURG SUP & DEVICES STERILE SUPPLY: Performed by: SURGERY

## 2019-12-26 PROCEDURE — S0028 INJECTION, FAMOTIDINE, 20 MG: HCPCS | Performed by: NURSE ANESTHETIST, CERTIFIED REGISTERED

## 2019-12-26 PROCEDURE — 71000015 HC POSTOP RECOV 1ST HR: Performed by: SURGERY

## 2019-12-26 PROCEDURE — 01610 ANES ALL PX NRV MUSC SHO&AX: CPT | Performed by: SURGERY

## 2019-12-26 PROCEDURE — 25000242 PHARM REV CODE 250 ALT 637 W/ HCPCS: Performed by: ANESTHESIOLOGY

## 2019-12-26 PROCEDURE — 38525 BIOPSY/REMOVAL LYMPH NODES: CPT | Mod: 51,RT,, | Performed by: SURGERY

## 2019-12-26 PROCEDURE — 37000008 HC ANESTHESIA 1ST 15 MINUTES: Performed by: SURGERY

## 2019-12-26 PROCEDURE — 27202105 HC BIS BILATERAL SENSOR: Performed by: ANESTHESIOLOGY

## 2019-12-26 PROCEDURE — 63600175 PHARM REV CODE 636 W HCPCS: Performed by: SURGERY

## 2019-12-26 PROCEDURE — 63600175 PHARM REV CODE 636 W HCPCS: Performed by: NURSE ANESTHETIST, CERTIFIED REGISTERED

## 2019-12-26 PROCEDURE — 38525 PR BIOPSY/REM LYMPH NODES, AXILLARY: ICD-10-PCS | Mod: 51,RT,, | Performed by: SURGERY

## 2019-12-26 PROCEDURE — 25000003 PHARM REV CODE 250: Performed by: NURSE ANESTHETIST, CERTIFIED REGISTERED

## 2019-12-26 PROCEDURE — 25000003 PHARM REV CODE 250: Performed by: SURGERY

## 2019-12-26 PROCEDURE — 36000706: Performed by: SURGERY

## 2019-12-26 PROCEDURE — 19301 PARTIAL MASTECTOMY: CPT | Mod: RT,,, | Performed by: SURGERY

## 2019-12-26 PROCEDURE — 38900 IO MAP OF SENT LYMPH NODE: CPT | Mod: RT,,, | Performed by: SURGERY

## 2019-12-26 PROCEDURE — 27201107 HC STYLET, STANDARD: Performed by: ANESTHESIOLOGY

## 2019-12-26 PROCEDURE — 27000673 HC TUBING BLOOD Y: Performed by: ANESTHESIOLOGY

## 2019-12-26 RX ORDER — SCOLOPAMINE TRANSDERMAL SYSTEM 1 MG/1
1 PATCH, EXTENDED RELEASE TRANSDERMAL
Status: DISCONTINUED | OUTPATIENT
Start: 2019-12-26 | End: 2019-12-26 | Stop reason: HOSPADM

## 2019-12-26 RX ORDER — HYDROCODONE BITARTRATE AND ACETAMINOPHEN 7.5; 325 MG/1; MG/1
1 TABLET ORAL EVERY 4 HOURS PRN
Qty: 30 TABLET | Refills: 0
Start: 2019-12-26 | End: 2020-01-06

## 2019-12-26 RX ORDER — GLYCOPYRROLATE 0.2 MG/ML
INJECTION INTRAMUSCULAR; INTRAVENOUS
Status: DISCONTINUED | OUTPATIENT
Start: 2019-12-26 | End: 2019-12-26

## 2019-12-26 RX ORDER — NEOSTIGMINE METHYLSULFATE 1 MG/ML
INJECTION, SOLUTION INTRAVENOUS
Status: DISCONTINUED | OUTPATIENT
Start: 2019-12-26 | End: 2019-12-26

## 2019-12-26 RX ORDER — SODIUM CHLORIDE 9 MG/ML
INJECTION, SOLUTION INTRAVENOUS CONTINUOUS
Status: DISCONTINUED | OUTPATIENT
Start: 2019-12-26 | End: 2019-12-26 | Stop reason: HOSPADM

## 2019-12-26 RX ORDER — LIDOCAINE HYDROCHLORIDE 10 MG/ML
INJECTION, SOLUTION EPIDURAL; INFILTRATION; INTRACAUDAL; PERINEURAL
Status: DISCONTINUED | OUTPATIENT
Start: 2019-12-26 | End: 2019-12-26

## 2019-12-26 RX ORDER — LEVALBUTEROL 1.25 MG/.5ML
1.25 SOLUTION, CONCENTRATE RESPIRATORY (INHALATION) ONCE
Status: COMPLETED | OUTPATIENT
Start: 2019-12-26 | End: 2019-12-26

## 2019-12-26 RX ORDER — OXYCODONE HYDROCHLORIDE 5 MG/1
5 TABLET ORAL
Status: DISCONTINUED | OUTPATIENT
Start: 2019-12-26 | End: 2019-12-26 | Stop reason: HOSPADM

## 2019-12-26 RX ORDER — BUPIVACAINE HYDROCHLORIDE AND EPINEPHRINE 2.5; 5 MG/ML; UG/ML
INJECTION, SOLUTION EPIDURAL; INFILTRATION; INTRACAUDAL; PERINEURAL
Status: DISCONTINUED | OUTPATIENT
Start: 2019-12-26 | End: 2019-12-26 | Stop reason: HOSPADM

## 2019-12-26 RX ORDER — DIPHENHYDRAMINE HYDROCHLORIDE 50 MG/ML
12.5 INJECTION INTRAMUSCULAR; INTRAVENOUS
Status: DISCONTINUED | OUTPATIENT
Start: 2019-12-26 | End: 2019-12-26 | Stop reason: HOSPADM

## 2019-12-26 RX ORDER — LIDOCAINE HYDROCHLORIDE 40 MG/ML
SOLUTION TOPICAL
Status: DISCONTINUED | OUTPATIENT
Start: 2019-12-26 | End: 2019-12-26

## 2019-12-26 RX ORDER — PROPOFOL 10 MG/ML
VIAL (ML) INTRAVENOUS
Status: DISCONTINUED | OUTPATIENT
Start: 2019-12-26 | End: 2019-12-26

## 2019-12-26 RX ORDER — SUCCINYLCHOLINE CHLORIDE 20 MG/ML
INJECTION INTRAMUSCULAR; INTRAVENOUS
Status: DISCONTINUED | OUTPATIENT
Start: 2019-12-26 | End: 2019-12-26

## 2019-12-26 RX ORDER — DEXAMETHASONE SODIUM PHOSPHATE 4 MG/ML
INJECTION, SOLUTION INTRA-ARTICULAR; INTRALESIONAL; INTRAMUSCULAR; INTRAVENOUS; SOFT TISSUE
Status: DISCONTINUED | OUTPATIENT
Start: 2019-12-26 | End: 2019-12-26

## 2019-12-26 RX ORDER — GABAPENTIN 100 MG/1
100 CAPSULE ORAL ONCE
Status: COMPLETED | OUTPATIENT
Start: 2019-12-26 | End: 2019-12-26

## 2019-12-26 RX ORDER — ACETAMINOPHEN 500 MG
1000 TABLET ORAL
Status: COMPLETED | OUTPATIENT
Start: 2019-12-26 | End: 2019-12-26

## 2019-12-26 RX ORDER — MIDAZOLAM HYDROCHLORIDE 1 MG/ML
INJECTION INTRAMUSCULAR; INTRAVENOUS
Status: DISCONTINUED | OUTPATIENT
Start: 2019-12-26 | End: 2019-12-26

## 2019-12-26 RX ORDER — FENTANYL CITRATE 50 UG/ML
25 INJECTION, SOLUTION INTRAMUSCULAR; INTRAVENOUS
Status: DISCONTINUED | OUTPATIENT
Start: 2019-12-26 | End: 2019-12-26 | Stop reason: HOSPADM

## 2019-12-26 RX ORDER — CEFAZOLIN SODIUM 2 G/50ML
2 SOLUTION INTRAVENOUS
Status: COMPLETED | OUTPATIENT
Start: 2019-12-26 | End: 2019-12-26

## 2019-12-26 RX ORDER — ROCURONIUM BROMIDE 10 MG/ML
INJECTION, SOLUTION INTRAVENOUS
Status: DISCONTINUED | OUTPATIENT
Start: 2019-12-26 | End: 2019-12-26

## 2019-12-26 RX ORDER — FAMOTIDINE 10 MG/ML
INJECTION INTRAVENOUS
Status: DISCONTINUED | OUTPATIENT
Start: 2019-12-26 | End: 2019-12-26

## 2019-12-26 RX ORDER — FENTANYL CITRATE 50 UG/ML
INJECTION, SOLUTION INTRAMUSCULAR; INTRAVENOUS
Status: DISCONTINUED | OUTPATIENT
Start: 2019-12-26 | End: 2019-12-26

## 2019-12-26 RX ORDER — SODIUM CHLORIDE, SODIUM LACTATE, POTASSIUM CHLORIDE, CALCIUM CHLORIDE 600; 310; 30; 20 MG/100ML; MG/100ML; MG/100ML; MG/100ML
INJECTION, SOLUTION INTRAVENOUS CONTINUOUS PRN
Status: DISCONTINUED | OUTPATIENT
Start: 2019-12-26 | End: 2019-12-26

## 2019-12-26 RX ADMIN — ROCURONIUM BROMIDE 20 MG: 10 INJECTION, SOLUTION INTRAVENOUS at 10:12

## 2019-12-26 RX ADMIN — FENTANYL CITRATE 100 MCG: 50 INJECTION INTRAMUSCULAR; INTRAVENOUS at 10:12

## 2019-12-26 RX ADMIN — SODIUM CHLORIDE, SODIUM LACTATE, POTASSIUM CHLORIDE, AND CALCIUM CHLORIDE: .6; .31; .03; .02 INJECTION, SOLUTION INTRAVENOUS at 11:12

## 2019-12-26 RX ADMIN — DEXAMETHASONE SODIUM PHOSPHATE 8 MG: 4 INJECTION, SOLUTION INTRAMUSCULAR; INTRAVENOUS at 10:12

## 2019-12-26 RX ADMIN — PROPOFOL 140 MG: 10 INJECTION, EMULSION INTRAVENOUS at 10:12

## 2019-12-26 RX ADMIN — LEVALBUTEROL HYDROCHLORIDE 1.25 MG: 1.25 SOLUTION, CONCENTRATE RESPIRATORY (INHALATION) at 11:12

## 2019-12-26 RX ADMIN — MIDAZOLAM HYDROCHLORIDE 1 MG: 1 INJECTION, SOLUTION INTRAMUSCULAR; INTRAVENOUS at 10:12

## 2019-12-26 RX ADMIN — ACETAMINOPHEN 1000 MG: 500 TABLET, FILM COATED ORAL at 09:12

## 2019-12-26 RX ADMIN — FENTANYL CITRATE 25 MCG: 50 INJECTION, SOLUTION INTRAMUSCULAR; INTRAVENOUS at 11:12

## 2019-12-26 RX ADMIN — SODIUM CHLORIDE, SODIUM LACTATE, POTASSIUM CHLORIDE, AND CALCIUM CHLORIDE: .6; .31; .03; .02 INJECTION, SOLUTION INTRAVENOUS at 09:12

## 2019-12-26 RX ADMIN — SCOPALAMINE 1 PATCH: 1 PATCH, EXTENDED RELEASE TRANSDERMAL at 09:12

## 2019-12-26 RX ADMIN — OXYCODONE HYDROCHLORIDE 5 MG: 5 TABLET ORAL at 11:12

## 2019-12-26 RX ADMIN — GABAPENTIN 100 MG: 100 CAPSULE ORAL at 09:12

## 2019-12-26 RX ADMIN — CEFAZOLIN SODIUM 2 G: 2 SOLUTION INTRAVENOUS at 10:12

## 2019-12-26 RX ADMIN — LIDOCAINE HYDROCHLORIDE 3 MG: 40 SOLUTION TOPICAL at 10:12

## 2019-12-26 RX ADMIN — FAMOTIDINE 20 MG: 10 INJECTION, SOLUTION INTRAVENOUS at 10:12

## 2019-12-26 RX ADMIN — SUCCINYLCHOLINE CHLORIDE 160 MG: 20 INJECTION, SOLUTION INTRAMUSCULAR; INTRAVENOUS at 10:12

## 2019-12-26 RX ADMIN — NEOSTIGMINE METHYLSULFATE 3 MG: 1 INJECTION INTRAVENOUS at 11:12

## 2019-12-26 RX ADMIN — LIDOCAINE HYDROCHLORIDE 100 MG: 10 INJECTION, SOLUTION EPIDURAL; INFILTRATION; INTRACAUDAL; PERINEURAL at 10:12

## 2019-12-26 RX ADMIN — GLYCOPYRROLATE 0.4 MG: 0.2 INJECTION INTRAMUSCULAR; INTRAVENOUS at 11:12

## 2019-12-26 NOTE — OP NOTE
Date of service is 12/26/2019    Preoperative diagnosis:  Right breast cancer    Postoperative diagnosis:  Same    Procedure:  1.  Right lumpectomy with ultrasound localization 2.  Right sentinel node biopsy    Description technical procedure and findings:    Patient was transported to the operating room. Prepped and draped in usual sterile fashion. After adequate induction of general anesthesia by department of anesthesia incision was made in the breast by the localizing wire. Core breast tissue around the localizing wire and the wire itself were excised en bloc and submitted to pathology.    Prior to the procedure patient was injected with radioactive tracer as well as Lymphazurin dye.  Incision was made in a longitudinal orientation in the right axilla.  Carried sharply through skin, subcutaneous tissue, closed pectoral fascia. Blue colored node which had high radiation counts was identified and sharply excised.  After the excision the radiation counts in the axilla dropped.          Wounds were irrigated and hemostasis was achieved with electrocautery. Wounds were closed in multiple layers of 4-0 Monocryl sutures. Sterile occlusive dressing was applied.

## 2019-12-26 NOTE — ANESTHESIA PROCEDURE NOTES
Intubation  Performed by: Deng Pretty CRNA  Authorized by: Nikita hSah MD     Intubation:     Induction:  Intravenous    Intubated:  Postinduction    Mask Ventilation:  Easy mask    Attempts:  1    Attempted By:  CRNA    Method of Intubation:  Direct    Blade:  Pardo 2    Laryngeal View Grade: Grade I - full view of chords      Difficult Airway Encountered?: No      Complications:  None    Airway Device:  Oral endotracheal tube    Airway Device Size:  7.5    Style/Cuff Inflation:  Cuffed    Inflation Amount (mL):  6    Tube secured:  22    Placement Verified By:  Capnometry    Complicating Factors:  None    Findings Post-Intubation:  BS equal bilateral and atraumatic/condition of teeth unchanged

## 2019-12-26 NOTE — PLAN OF CARE
Awake resp deep and reg resp tx with xopenex given davy well, x2 dressing to right breast dry and in tact

## 2019-12-26 NOTE — ANESTHESIA PREPROCEDURE EVALUATION
12/26/2019  Claire Mcnamara is a 59 y.o., female.  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 She has been smoking about 0.25 packs per day. She has never used smokeless tobacco.     Results for orders placed or performed during the hospital encounter of 12/23/19   EKG 12-lead    Collection Time: 12/23/19 11:26 AM    Narrative    Test Reason : C50.211,    Vent. Rate : 081 BPM     Atrial Rate : 081 BPM     P-R Int : 146 ms          QRS Dur : 076 ms      QT Int : 432 ms       P-R-T Axes : 009 044 055 degrees     QTc Int : 501 ms    Normal sinus rhythm  Prolonged QT  Abnormal ECG  No previous ECGs available  Confirmed by Walker MIX, David DURAN ([id number]) on 12/23/2019 9:25:58 PM    Referred By:  AZAEL           Confirmed By:David Cardoso MD             Lab Results   Component Value Date    WBC 11.58 12/23/2019    HGB 13.4 12/23/2019    HCT 40.2 12/23/2019    MCV 90 12/23/2019     12/23/2019     BMP  Lab Results   Component Value Date     12/23/2019    K 3.5 12/23/2019     12/23/2019    CO2 31 (H) 12/23/2019    BUN 10 12/23/2019    CREATININE 0.5 12/23/2019    CALCIUM 9.6 12/23/2019    ANIONGAP 9 12/23/2019    ESTGFRAFRICA >60.0 12/23/2019    EGFRNONAA >60.0 12/23/2019         Anesthesia Evaluation    I have reviewed the Patient Summary Reports.    I have reviewed the Nursing Notes.   I have reviewed the Medications.     Review of Systems  Anesthesia Hx:  No problems with previous Anesthesia Denies Hx of Anesthetic  complications  History of prior surgery of interest to airway management or planning: Denies Family Hx of Anesthesia complications.   Denies Personal Hx of Anesthesia complications.   Social:  Smoker, Alcohol Use    Hematology/Oncology:  Hematology Normal      Current/Recent Cancer. Breast right   EENT/Dental:EENT/Dental Normal   Cardiovascular:   Hypertension, poorly controlled ECG has been reviewed.    Pulmonary:   Asthma mild Daily Inhalers   No nebulizer use  No Home O2  No Pulmonologist   Last Attack 1 week ago   Never Hospitalized    Renal/:  Renal/ Normal     Hepatic/GI:   GERD, well controlled    Musculoskeletal:  Musculoskeletal Normal    Neurological:  Neurology Normal    Endocrine:   Diabetes, well controlled, type 2    Dermatological:  Skin Normal    Psych:   Psychiatric History anxiety depression          Physical Exam  General:  Obesity    Airway/Jaw/Neck:  Airway Findings: Mouth Opening: Normal Tongue: Normal  General Airway Assessment: Adult  Mallampati: III  Improves to III with phonation.  TM Distance: < 4 cm  Jaw/Neck Findings:  Neck ROM: Normal ROM      Dental:  Dental Findings: Upper partial dentures, Lower partial dentures   Chest/Lungs:  Chest/Lungs Findings: Normal Respiratory Rate, Rhonchi, Expiratory Wheezes, Mild     Heart/Vascular:  Heart Findings: Rate: Normal  Rhythm: Regular Rhythm  Sounds: Normal  Heart murmur: negative Vascular Findings: Normal    Abdomen:  Abdomen Findings: Normal    Musculoskeletal:  Musculoskeletal Findings: Normal   Skin:  Skin Findings: Normal    Mental Status:  Mental Status Findings:  Cooperative, Alert and Oriented         Anesthesia Plan  Type of Anesthesia, risks & benefits discussed:  Anesthesia Type:  general  Patient's Preference: General  Intra-op Monitoring Plan: standard ASA monitors  Intra-op Monitoring Plan Comments:   Post Op Pain Control Plan: multimodal analgesia, IV/PO Opioids PRN and per primary service following discharge from PACU  Post  Op Pain Control Plan Comments:   Induction:   IV  Beta Blocker:  Patient is not currently on a Beta-Blocker (No further documentation required).       Informed Consent: Patient understands risks and agrees with Anesthesia plan.  Questions answered. Anesthesia consent signed with patient.  ASA Score: 3     Day of Surgery Review of History & Physical:        Anesthesia Plan Notes: GETA  Minimal Versed Patient took ativan prior to arrival to hospital  NO ZOFRAN ( PROLONGED QT )  Benadryl 6.25mg iv  Pepcid 20mg iv   Tylenol 1000 mg po   Neurontin 100mg po        Ready For Surgery From Anesthesia Perspective.

## 2019-12-26 NOTE — ANESTHESIA POSTPROCEDURE EVALUATION
Anesthesia Post Evaluation    Patient: Claire Mcnamara    Procedure(s) Performed: Procedure(s) (LRB):  LUMPECTOMY, BREAST (Right)  BIOPSY, LYMPH NODE, SENTINEL (Right)  NEEDLE LOCALIZATION (Right)    Final Anesthesia Type: general    Patient location during evaluation: PACU  Patient participation: Yes- Able to Participate  Level of consciousness: awake and alert, oriented and awake  Post-procedure vital signs: reviewed and stable  Pain management: adequate  Airway patency: patent    PONV status at discharge: No PONV  Anesthetic complications: no      Cardiovascular status: blood pressure returned to baseline, hemodynamically stable and stable  Respiratory status: unassisted, spontaneous ventilation and room air  Hydration status: euvolemic  Follow-up not needed.          Vitals Value Taken Time   /85 12/26/2019 11:45 AM   Temp 36.5 °C (97.7 °F) 12/26/2019 11:23 AM   Pulse 73 12/26/2019 11:56 AM   Resp 18 12/26/2019 11:45 AM   SpO2 95 % 12/26/2019 11:56 AM   Vitals shown include unvalidated device data.      No case tracking events are documented in the log.      Pain/Karl Score: Pain Rating Prior to Med Admin: 4 (12/26/2019 11:45 AM)  Karl Score: 10 (12/26/2019 11:45 AM)

## 2019-12-26 NOTE — BRIEF OP NOTE
[facility]  Brief Operative Note    Surgery Date: 12/26/2019     Surgeon(s) and Role:     * Luis Manuel Rios MD - Primary    Assisting Surgeon: None    Pre-op Diagnosis:  Malignant neoplasm of upper-inner quadrant of right female breast, unspecified estrogen receptor status [C50.211]    Post-op Diagnosis:  Post-Op Diagnosis Codes:     * Malignant neoplasm of upper-inner quadrant of right female breast, unspecified estrogen receptor status [C50.211]    Procedure(s) (LRB):  LUMPECTOMY, BREAST (Right)  BIOPSY, LYMPH NODE, SENTINEL (Right)  NEEDLE LOCALIZATION (Right)    Anesthesia: General    Description of the findings of the procedure(s):  See dictation    Estimated Blood Loss: * No values recorded between 12/26/2019 12:00 AM and 12/26/2019 11:19 AM *         Specimens:   Specimen (12h ago, onward)     Start     Ordered    12/26/19 1043  Specimen to Pathology - Surgery  Once     Comments:  Pre-op Diagnosis: Malignant neoplasm of upper-inner quadrant of right female breast, unspecified estrogen receptor status [C50.211]Procedure(s):LUMPECTOMY, BREASTBIOPSY, LYMPH NODE, SENTINELNEEDLE LOCALIZATION Number of specimens: 2  (REQUEST ONCO TYPE)Name of specimens:1) right breast mass  2) right sentinel lymph node  (REQEST ONCO TYPE)      12/26/19 1110                  Discharge Note    OUTCOME: Patient tolerated treatment/procedure well without complication and is now ready for discharge.    DISPOSITION: Home or Self Care    FINAL DIAGNOSIS:  Malignant neoplasm of upper-inner quadrant of right female breast    FOLLOWUP: In clinic

## 2019-12-26 NOTE — DISCHARGE INSTRUCTIONS
Discharge Instructions for Lumpectomy or Breast Biopsy  You just had a procedure to remove a lump or a small piece of tissue from your breast. After surgery, be sure to have an adult drive you home. Ask your healthcare provider when you will get the results of the biopsy. Will they call you or will you talk about it at your next visit?  Make a follow-up appointment as directed by your healthcare provider.  What to expect  The following are common after a lumpectomy or breast biopsy:   · Bruising and mild swelling around the incision  · Mild discomfort for a few days.  · Feeling tired for a day or so.  · Feeling anxious or down.   Diet  What to eat and drink after a lumpectomy or breast biopsy:   · Start with liquids and light, easy-to-digest foods, such as bananas and dry toast. As you feel up to it, slowly return to your normal diet.  · Drink at least 6 to 8 glasses of water or other nonalcoholic fluids a day, unless directed otherwise.  Activity  What you can do after a lumpectomy or breast biopsy:   · After the procedure, take it easy for the rest of the day.  · If you had general anesthesia, don't use machinery or power tools, drink alcohol, or make any major decisions for at least the first 24 hours.  · Ask your healthcare providers how you should care for the dressing and when you can take it off.  · You may shower and pat the incision (cut) dry, but don't soak in a tub until you see the healthcare provider again.   · Return to normal activities (including driving) in 24 hours unless otherwise instructed by your healthcare provider.   Bandage and incision care  Suggestions for care include:  · Take pain medicines as directed. Dont wait until the pain gets bad before taking them. Dont drink alcohol while on pain medicines.  · Wrap a waterproof ice pack or bag of frozen peas in a thin cloth. Place this over the bandaged incision for no longer than 20 minutes at a time. Do this as instructed by your  healthcare provider.  · Wear a comfortable, snug-fitting bra at all times, even to bed, to help keep swelling down.  · If your incision has been closed with glue, do not apply lotion, ointments, or creams to the area. Doing so can cause the glue to dissolve.  · If strips of tape have been used to close your incision, do not pull them off. Let them fall off on their own.  · If you have a gauze bandage, keep it and the wound dry for 48 hours. If the gauze bandage gets wet, replace it with a clean, dry bandage.    When to call your healthcare provider  Call your healthcare provider right away if you have any of the following:  · Vomiting or nausea that does not go away  · Fever over 100.4°F (38°C) or chills  · Foul-smelling discharge from the incision  · Pain not relieved by pain medicines  · Bleeding, warmth, redness, or hard swelling around the incision  · Chest pain or shortness of breath  Be sure you know how to get help anytime you have problems or questions, including after office hours, on weekends, and on holidays.   Date Last Reviewed: 10/31/2015  © 8937-3369 [employer]. [address] [place] [address]. All rights reserved. This information is not intended as a substitute for professional medical care. Always follow your healthcare professional's instructions.        Discharge Instructions: After Your Surgery  Youve just had surgery. During surgery, you were given medicine called anesthesia to keep you relaxed and free of pain. After surgery, you may have some pain or nausea. This is common. Here are some tips for feeling better and getting well after surgery.     Stay on schedule with your medicine.   Going home  Your healthcare provider will show you how to take care of yourself when you go home. He or she will also answer your questions. Have an adult family member or friend drive you home. For the first 24 hours after your surgery:  · Do not drive or use heavy equipment.  · Do not  make important decisions or sign legal papers.  · Do not drink alcohol.  · Have someone stay with you, if needed. He or she can watch for problems and help keep you safe.  Be sure to go to all follow-up visits with your healthcare provider. And rest after your surgery for as long as your healthcare provider tells you to.  Coping with pain  If you have pain after surgery, pain medicine will help you feel better. Take it as told, before pain becomes severe. Also, ask your healthcare provider or pharmacist about other ways to control pain. This might be with heat, ice, or relaxation. And follow any other instructions your surgeon or nurse gives you.  Tips for taking pain medicine  To get the best relief possible, remember these points:  · Pain medicines can upset your stomach. Taking them with a little food may help.  · Most pain relievers taken by mouth need at least 20 to 30 minutes to start to work.  · Taking medicine on a schedule can help you remember to take it. Try to time your medicine so that you can take it before starting an activity. This might be before you get dressed, go for a walk, or sit down for dinner.  · Constipation is a common side effect of pain medicines. Call your healthcare provider before taking any medicines such as laxatives or stool softeners to help ease constipation. Also ask if you should skip any foods. Drinking lots of fluids and eating foods such as fruits and vegetables that are high in fiber can also help. Remember, do not take laxatives unless your surgeon has prescribed them.  · Drinking alcohol and taking pain medicine can cause dizziness and slow your breathing. It can even be deadly. Do not drink alcohol while taking pain medicine.  · Pain medicine can make you react more slowly to things. Do not drive or run machinery while taking pain medicine.  Your healthcare provider may tell you to take acetaminophen to help ease your pain. Ask him or her how much you are supposed to  take each day. Acetaminophen or other pain relievers may interact with your prescription medicines or other over-the-counter (OTC) medicines. Some prescription medicines have acetaminophen and other ingredients. Using both prescription and OTC acetaminophen for pain can cause you to overdose. Read the labels on your OTC medicines with care. This will help you to clearly know the list of ingredients, how much to take, and any warnings. It may also help you not take too much acetaminophen. If you have questions or do not understand the information, ask your pharmacist or healthcare provider to explain it to you before you take the OTC medicine.  Managing nausea  Some people have an upset stomach after surgery. This is often because of anesthesia, pain, or pain medicine, or the stress of surgery. These tips will help you handle nausea and eat healthy foods as you get better. If you were on a special food plan before surgery, ask your healthcare provider if you should follow it while you get better. These tips may help:  · Do not push yourself to eat. Your body will tell you when to eat and how much.  · Start off with clear liquids and soup. They are easier to digest.  · Next try semi-solid foods, such as mashed potatoes, applesauce, and gelatin, as you feel ready.  · Slowly move to solid foods. Dont eat fatty, rich, or spicy foods at first.  · Do not force yourself to have 3 large meals a day. Instead eat smaller amounts more often.  · Take pain medicines with a small amount of solid food, such as crackers or toast, to avoid nausea.     Call your surgeon if  · You still have pain an hour after taking medicine. The medicine may not be strong enough.  · You feel too sleepy, dizzy, or groggy. The medicine may be too strong.  · You have side effects like nausea, vomiting, or skin changes, such as rash, itching, or hives.       If you have obstructive sleep apnea  You were given anesthesia medicine during surgery to keep  you comfortable and free of pain. After surgery, you may have more apnea spells because of this medicine and other medicines you were given. The spells may last longer than usual.   At home:  · Keep using the continuous positive airway pressure (CPAP) device when you sleep. Unless your healthcare provider tells you not to, use it when you sleep, day or night. CPAP is a common device used to treat obstructive sleep apnea.  · Talk with your provider before taking any pain medicine, muscle relaxants, or sedatives. Your provider will tell you about the possible dangers of taking these medicines.  Date Last Reviewed: 12/1/2016  © 5737-7653 [employer]. [address] [place] [address], [place] [address]. All rights reserved. This information is not intended as a substitute for professional medical care. Always follow your healthcare professional's instructions.

## 2019-12-26 NOTE — TRANSFER OF CARE
"Anesthesia Transfer of Care Note    Patient: Claire Mcnamara    Procedure(s) Performed: Procedure(s) (LRB):  LUMPECTOMY, BREAST (Right)  BIOPSY, LYMPH NODE, SENTINEL (Right)  NEEDLE LOCALIZATION (Right)    Patient location: PACU    Anesthesia Type: general    Transport from OR: Transported from OR on room air with adequate spontaneous ventilation    Post pain: adequate analgesia    Post assessment: no apparent anesthetic complications    Post vital signs: stable    Level of consciousness: awake    Nausea/Vomiting: no nausea/vomiting    Complications: none    Transfer of care protocol was followed      Last vitals:   Visit Vitals  BP (!) 145/92 (BP Location: Right arm, Patient Position: Lying)   Pulse 74   Temp 36.7 °C (98.1 °F) (Oral)   Resp 17   Ht 5' 6" (1.676 m)   Wt 91.6 kg (202 lb)   SpO2 99%   Breastfeeding? No   BMI 32.60 kg/m²     "

## 2019-12-26 NOTE — PLAN OF CARE
13:00  Pt tolerating oral liquids and voiding without difficulty, IV removed, catheter tip intact, gauze dressing applied to site, pt ready for discharge

## 2019-12-26 NOTE — INTERVAL H&P NOTE
The patient has been examined and the H&P has been reviewed:    I concur with the findings and no changes have occurred since H&P was written.    Anesthesia/Surgery risks, benefits and alternative options discussed and understood by patient/family.          Active Hospital Problems    Diagnosis  POA    Malignant neoplasm of upper-inner quadrant of right female breast [C50.211]  Yes      Resolved Hospital Problems   No resolved problems to display.

## 2020-01-06 ENCOUNTER — OFFICE VISIT (OUTPATIENT)
Dept: SURGERY | Facility: CLINIC | Age: 60
End: 2020-01-06
Payer: MEDICAID

## 2020-01-06 VITALS
BODY MASS INDEX: 32.47 KG/M2 | DIASTOLIC BLOOD PRESSURE: 112 MMHG | HEART RATE: 76 BPM | SYSTOLIC BLOOD PRESSURE: 116 MMHG | TEMPERATURE: 98 F | HEIGHT: 66 IN | WEIGHT: 202 LBS

## 2020-01-06 DIAGNOSIS — C50.211 MALIGNANT NEOPLASM OF UPPER-INNER QUADRANT OF RIGHT FEMALE BREAST, UNSPECIFIED ESTROGEN RECEPTOR STATUS: Primary | ICD-10-CM

## 2020-01-06 PROCEDURE — 99024 PR POST-OP FOLLOW-UP VISIT: ICD-10-PCS | Mod: ,,, | Performed by: SURGERY

## 2020-01-06 PROCEDURE — 99024 POSTOP FOLLOW-UP VISIT: CPT | Mod: ,,, | Performed by: SURGERY

## 2020-01-06 PROCEDURE — 99213 OFFICE O/P EST LOW 20 MIN: CPT | Performed by: SURGERY

## 2020-01-06 NOTE — LETTER
January 6, 2020      Luis Manuel Montalvo DO  [address]  [facility]  [place] [address]           [facility]  [address]  [place] [address]  Phone: [phone]  Fax: [phone]          Patient: Claire Mcnamara   MR Number: [id number]   YOB: 1960   Date of Visit: 1/6/2020       Dear Dr. Luis Manuel Montalvo:    Thank you for referring Claire Mcnamara to me for evaluation. Attached you will find relevant portions of my assessment and plan of care.    If you have questions, please do not hesitate to call me. I look forward to following Claire Mcnamara along with you.    Sincerely,    Luis Manuel Rios MD    Enclosure  CC:  No Recipients    If you would like to receive this communication electronically, please contact [email or web address] or [phone] to request more information on [employer] Link access.    For providers and/or their staff who would like to refer a patient to [facility], please contact us through our one-stop-shop provider referral line, [facility], at [phone].    If you feel you have received this communication in error or would no longer like to receive these types of communications, please e-mail [email or web address]

## 2020-01-06 NOTE — PROGRESS NOTES
Subjective:       Patient ID: Claire Mcnamara is a 59 y.o. female.    Chief Complaint: Post-op Evaluation (FU DOS 12/26/2019 RT Lumpectomy w/ ultrasound loc & RT SLN BX)      HPI:  Claire Mcnamara is here for post-op. Patient has no systemic complaints. Post operative   pain is under control.  Tolerating diet, no nausea/vomiting.  Having normal bowel movements.        Objective:      Physical Exam   Constitutional: She is oriented to person, place, and time. She is cooperative. No distress.   Neurological: She is alert and oriented to person, place, and time.   Skin:   Incisions are clean, dry and intact   There is no evidence of infection, hematoma or seroma.        Assessment/Plan:   Malignant neoplasm of upper-inner quadrant of right female breast, unspecified estrogen receptor status      Patient doing well postop.  Margins are negative. [place] lymph node is negative. Patient is going to see her oncologist.  Follow up here p.r.n..      Follow up if symptoms worsen or fail to improve.

## 2020-01-27 ENCOUNTER — OFFICE VISIT (OUTPATIENT)
Dept: HEMATOLOGY/ONCOLOGY | Facility: CLINIC | Age: 60
End: 2020-01-27
Payer: MEDICAID

## 2020-01-27 VITALS
SYSTOLIC BLOOD PRESSURE: 152 MMHG | WEIGHT: 208.88 LBS | TEMPERATURE: 99 F | HEART RATE: 97 BPM | BODY MASS INDEX: 33.72 KG/M2 | DIASTOLIC BLOOD PRESSURE: 99 MMHG | RESPIRATION RATE: 19 BRPM

## 2020-01-27 DIAGNOSIS — Z17.0 MALIGNANT NEOPLASM OF CENTRAL PORTION OF RIGHT BREAST IN FEMALE, ESTROGEN RECEPTOR POSITIVE: ICD-10-CM

## 2020-01-27 DIAGNOSIS — C50.111 MALIGNANT NEOPLASM OF CENTRAL PORTION OF RIGHT BREAST IN FEMALE, ESTROGEN RECEPTOR POSITIVE: ICD-10-CM

## 2020-01-27 PROCEDURE — 99213 OFFICE O/P EST LOW 20 MIN: CPT | Mod: S$GLB,,, | Performed by: INTERNAL MEDICINE

## 2020-01-27 PROCEDURE — 99213 PR OFFICE/OUTPT VISIT, EST, LEVL III, 20-29 MIN: ICD-10-PCS | Mod: S$GLB,,, | Performed by: INTERNAL MEDICINE

## 2020-01-27 NOTE — ASSESSMENT & PLAN NOTE
Patient is s/p lumpectomy and is doing well.  I discussed that oncotype is pending at this time and this will be used to decide on chemotherapy.  I did discuss today that she will need radiation therapy and AI therapy and will make sure the appointment is arranged.  I will have her back with me in 6 weeks to see how she is doing and begin AI therapy.      Will call with oncotype and will arrange visit shows she needs chemotherapy.

## 2020-01-27 NOTE — PROGRESS NOTES
PROGRESS NOTE    Subjective:       Patient ID: Claire Mcnamara is a 59 y.o. female.    10/7/2019   mammogram:  17mm spiculated mass in right breast along the nipple line.     11/20/2019   Diagnosis via needle biopsy  Grade I, IDCA  ER: 99%, KY: 95%, Her 2 negative    12/26/2020:  Lumpectomy/SLN  16mm IDCA, Margins negative(2mm)  DCIS present  SLN x 1 negative     Chief Complaint:  No chief complaint on file.  breast cancer follow up    History of Present Illness:   Claire Mcnamara is a 59 y.o. female who presents for follow up after surgery.       Family and Social history reviewed and is unchanged from 12/20/2020      ROS:  Review of Systems   Constitutional: Negative for fever.   Respiratory: Negative for shortness of breath.    Cardiovascular: Negative for chest pain and leg swelling.   Gastrointestinal: Negative for abdominal pain and blood in stool.   Genitourinary: Negative for hematuria.   Skin: Negative for rash.          Current Outpatient Medications:     albuterol (PROVENTIL HFA) 90 mcg/actuation inhaler, Proventil HFA 90 mcg/actuation aerosol inhaler  Inhale 2 puffs every 4 hours by inhalation route as needed for SOB for 30 days., Disp: , Rfl:     amLODIPine (NORVASC) 10 MG tablet, Take 10 mg by mouth once daily., Disp: , Rfl:     ascorbic acid, vitamin C, (VITAMIN C) 500 MG tablet, Take 500 mg by mouth once daily., Disp: , Rfl:     FLUoxetine 40 MG capsule, Take 1 capsule by mouth once daily., Disp: , Rfl:     hydroCHLOROthiazide (HYDRODIURIL) 25 MG tablet, Take 1 tablet by mouth once daily., Disp: , Rfl: 3    LORazepam (ATIVAN) 0.5 MG tablet, Take 0.5 mg by mouth 2 (two) times daily as needed. , Disp: , Rfl: 3    metFORMIN (GLUCOPHAGE) 1000 MG tablet, Take 1 tablet by mouth every evening., Disp: , Rfl: 3    montelukast (SINGULAIR) 10 mg tablet, Take 10 mg by mouth once daily. , Disp: , Rfl:     omeprazole (PRILOSEC) 40 MG capsule, Take 1  capsule by mouth before breakfast., Disp: , Rfl: 3    rosuvastatin (CRESTOR) 10 MG tablet, Take 1 tablet by mouth once daily., Disp: , Rfl: 3    vitamin D (VITAMIN D3) 1000 units Tab, Take 1,000 Units by mouth once daily., Disp: , Rfl:     Current Facility-Administered Medications:     lidocaine (PF) 10 mg/ml (1%) injection 10 mg, 1 mL, Intradermal, Once, Luis Manuel Rios MD        Objective:       Physical Examination:     BP (!) 152/99   Pulse 97   Temp 98.5 °F (36.9 °C) (Oral)   Resp 19   Wt 94.8 kg (208 lb 14.4 oz)   BMI 33.72 kg/m²     Physical Exam   Constitutional: She appears well-developed and well-nourished.   HENT:   Head: Normocephalic and atraumatic.   Right Ear: External ear normal.   Left Ear: External ear normal.   Mouth/Throat: Oropharynx is clear and moist.   Eyes: Pupils are equal, round, and reactive to light. Conjunctivae are normal.   Neck: No tracheal deviation present. No thyromegaly present.   Cardiovascular: Normal rate, regular rhythm and normal heart sounds.   Pulmonary/Chest: Effort normal and breath sounds normal.   Abdominal: Soft. Bowel sounds are normal. She exhibits no distension and no mass. There is no tenderness.   Musculoskeletal: She exhibits no edema.   Neurological:   Neuro intact througout   Skin: No rash noted.   Psychiatric: She has a normal mood and affect. Her behavior is normal. Judgment and thought content normal.       Labs:   No results found for this or any previous visit (from the past 336 hour(s)).  CMP  Sodium   Date Value Ref Range Status   12/23/2019 142 136 - 145 mmol/L Final     Potassium   Date Value Ref Range Status   12/23/2019 3.5 3.5 - 5.1 mmol/L Final     Chloride   Date Value Ref Range Status   12/23/2019 102 95 - 110 mmol/L Final     CO2   Date Value Ref Range Status   12/23/2019 31 (H) 23 - 29 mmol/L Final     Glucose   Date Value Ref Range Status   12/23/2019 89 70 - 110 mg/dL Final     BUN, Bld   Date Value Ref Range Status   12/23/2019 10  6 - 20 mg/dL Final     Creatinine   Date Value Ref Range Status   12/23/2019 0.5 0.5 - 1.4 mg/dL Final     Calcium   Date Value Ref Range Status   12/23/2019 9.6 8.7 - 10.5 mg/dL Final     Total Protein   Date Value Ref Range Status   12/23/2019 8.1 6.0 - 8.4 g/dL Final     Albumin   Date Value Ref Range Status   12/23/2019 4.5 3.5 - 5.2 g/dL Final     Total Bilirubin   Date Value Ref Range Status   12/23/2019 0.8 0.1 - 1.0 mg/dL Final     Comment:     For infants and newborns, interpretation of results should be based  on gestational age, weight and in agreement with clinical  observations.  Premature Infant recommended reference ranges:  Up to 24 hours.............<8.0 mg/dL  Up to 48 hours............<12.0 mg/dL  3-5 days..................<15.0 mg/dL  6-29 days.................<15.0 mg/dL       Alkaline Phosphatase   Date Value Ref Range Status   12/23/2019 72 55 - 135 U/L Final     AST   Date Value Ref Range Status   12/23/2019 33 10 - 40 U/L Final     ALT   Date Value Ref Range Status   12/23/2019 28 10 - 44 U/L Final     Anion Gap   Date Value Ref Range Status   12/23/2019 9 8 - 16 mmol/L Final     eGFR if    Date Value Ref Range Status   12/23/2019 >60.0 >60 mL/min/1.73 m^2 Final     eGFR if non    Date Value Ref Range Status   12/23/2019 >60.0 >60 mL/min/1.73 m^2 Final     Comment:     Calculation used to obtain the estimated glomerular filtration  rate (eGFR) is the CKD-EPI equation.        No results found for: CEA  No results found for: PSA        Assessment/Plan:     Problem List Items Addressed This Visit     Malignant neoplasm of central portion of right breast in female, estrogen receptor positive     Patient is s/p lumpectomy and is doing well.  I discussed that oncotype is pending at this time and this will be used to decide on chemotherapy.  I did discuss today that she will need radiation therapy and AI therapy and will make sure the appointment is arranged.  I will  have her back with me in 6 weeks to see how she is doing and begin AI therapy.      Will call with oncotype and will arrange visit shows she needs chemotherapy.           Relevant Orders    Ambulatory referral to Radiation Oncology          Discussion:     Follow up in about 6 weeks (around 3/9/2020).      Electronically signed by Ramo Chris

## 2020-01-28 ENCOUNTER — TELEPHONE (OUTPATIENT)
Dept: HEMATOLOGY/ONCOLOGY | Facility: CLINIC | Age: 60
End: 2020-01-28

## 2020-01-28 NOTE — TELEPHONE ENCOUNTER
Received Oncotype DX report. Dr. Saenz said no chemo needed. Patient is to see the Rad/Onc doctor this Friday as scheduled and follow his recommendation. For any treatment he recommends. But no chemo is needed at this time. She voiced understanding of all. Oncotype Dx report is scanned into patient's chart in media.

## 2020-01-30 ENCOUNTER — OFFICE VISIT (OUTPATIENT)
Dept: SURGERY | Facility: CLINIC | Age: 60
End: 2020-01-30
Payer: MEDICAID

## 2020-01-30 VITALS
WEIGHT: 208 LBS | SYSTOLIC BLOOD PRESSURE: 197 MMHG | BODY MASS INDEX: 33.43 KG/M2 | HEIGHT: 66 IN | TEMPERATURE: 98 F | DIASTOLIC BLOOD PRESSURE: 98 MMHG | HEART RATE: 101 BPM

## 2020-01-30 DIAGNOSIS — S20.01XA POSTTRAUMATIC HEMATOMA OF BREAST, RIGHT, INITIAL ENCOUNTER: Primary | ICD-10-CM

## 2020-01-30 PROCEDURE — 10021 FNA BX W/O IMG GDN 1ST LES: CPT | Mod: PBBFAC | Performed by: SURGERY

## 2020-01-30 PROCEDURE — 10021 PR FINE NEEDLE ASP BIOPSY, W/O IMAGING GUIDANCE, 1ST LESION: ICD-10-PCS | Mod: S$PBB,,, | Performed by: SURGERY

## 2020-01-30 PROCEDURE — 99213 PR OFFICE/OUTPT VISIT, EST, LEVL III, 20-29 MIN: ICD-10-PCS | Mod: 24,25,S$PBB, | Performed by: SURGERY

## 2020-01-30 PROCEDURE — 10021 FNA BX W/O IMG GDN 1ST LES: CPT | Mod: S$PBB,,, | Performed by: SURGERY

## 2020-01-30 PROCEDURE — 99213 OFFICE O/P EST LOW 20 MIN: CPT | Performed by: SURGERY

## 2020-01-30 PROCEDURE — 99213 OFFICE O/P EST LOW 20 MIN: CPT | Mod: 24,25,S$PBB, | Performed by: SURGERY

## 2020-01-30 PROCEDURE — G0463 HOSPITAL OUTPT CLINIC VISIT: HCPCS | Mod: 25

## 2020-01-30 NOTE — PROGRESS NOTES
Patient reports that she felt and struck the breast where she had surgery. Complains of painful mass in the area of surgery. No systemic complaints.    Patient examined.  Patient does indeed have a tender mass underneath the incision. Feels like a seroma and/or hematoma.  Some subtle ecchymosis. No erythema.    Under sterile conditions the area aspirated.  Approximately 120 cc of dark old blood was aspirated.  Patient had immediate resolution of symptoms.  There is however some residual mass which is either a solid hematoma that will come through a needle or local swelling.    Patient advised that this may reaccumulate in if she gets symptoms again I will be happy to drain it. As long as she stays asymptomatic no further treatment is necessary.  The case was discussed with patient's radiation oncologist since patient is due to start radiation soon.

## 2020-01-31 ENCOUNTER — SOCIAL WORK (OUTPATIENT)
Dept: HEMATOLOGY/ONCOLOGY | Facility: CLINIC | Age: 60
End: 2020-01-31

## 2020-01-31 ENCOUNTER — DOCUMENTATION ONLY (OUTPATIENT)
Dept: RADIATION ONCOLOGY | Facility: CLINIC | Age: 60
End: 2020-01-31

## 2020-01-31 ENCOUNTER — OFFICE VISIT (OUTPATIENT)
Dept: RADIATION ONCOLOGY | Facility: CLINIC | Age: 60
End: 2020-01-31
Payer: MEDICAID

## 2020-01-31 VITALS
WEIGHT: 208 LBS | BODY MASS INDEX: 33.57 KG/M2 | HEART RATE: 86 BPM | OXYGEN SATURATION: 96 % | RESPIRATION RATE: 18 BRPM | SYSTOLIC BLOOD PRESSURE: 116 MMHG | DIASTOLIC BLOOD PRESSURE: 83 MMHG | TEMPERATURE: 98 F

## 2020-01-31 DIAGNOSIS — Z17.0 MALIGNANT NEOPLASM OF CENTRAL PORTION OF RIGHT BREAST IN FEMALE, ESTROGEN RECEPTOR POSITIVE: Primary | ICD-10-CM

## 2020-01-31 DIAGNOSIS — C50.111 MALIGNANT NEOPLASM OF CENTRAL PORTION OF RIGHT BREAST IN FEMALE, ESTROGEN RECEPTOR POSITIVE: Primary | ICD-10-CM

## 2020-01-31 PROCEDURE — 99204 PR OFFICE/OUTPT VISIT, NEW, LEVL IV, 45-59 MIN: ICD-10-PCS | Mod: S$GLB,,, | Performed by: RADIOLOGY

## 2020-01-31 PROCEDURE — 99204 OFFICE O/P NEW MOD 45 MIN: CPT | Mod: S$GLB,,, | Performed by: RADIOLOGY

## 2020-01-31 NOTE — PROGRESS NOTES
Met with patient to complete new patient orientation and the NCCN Distress Screening; she indicated a rating of 5.  Patient denied wanting psychosocial support.  She is diagnosed with Asthma and is under the care of her MD.  I provided my contact information in the event she needed supportive services in the future.

## 2020-01-31 NOTE — PROGRESS NOTES
Claire Anders  [id number]  1960  Ramo Chris Md  [address]  [address]  [place] [address]    REASON FOR CONSULTATION: IA, pT1cN0(sn)M0 g1 IDC R breast, ER+/SC+/her2(-)    TREATMENT GOAL: adjuvant    HISTORY OF PRESENT ILLNESS:   59F presented with abnormal screening mammogram detecting a spiculated mass, 1.6 x 1.7 cm in the right breast with ultrasound confirming a 1.6 x 1.5 x 0.5 cm irregular, ill-defined, spiculated mass at the right breast, 1:00, 9 cm from the nipple.  Mildly prominent right axillary adenopathy was appreciated, largest 1.3 and 1.8 cm in size.  Core needle biopsy of the right breast returned grade 1 invasive ductal carcinoma, [place] 4/9, ER+ @ 99%, SC+ @ 96%, her2 (-).    Dr. Rios took the patient for right lumpectomy and sentinel lymph node assessment that revealed:   - 1.6 cm grade 1 invasive ductal carcinoma, [place] 4/9, margin negative at 2 mm   - low/intermediate grade DCIS, cribriform, margin negative at 10 mm   - 0/1 SLN    She met with Dr. Chris who ordered Oncotype DX testing returned score 12 and recommended adjuvant radiotherapy and antiestrogen therapy (AI), no chemotherapy indicated.    Patient recently had a fall with resultant hematoma of the right breast status post aspiration by Dr. Rios.    Patient denies fever, chills, chest pain, shortness of breath, cough or hemoptysis.  She has some swelling of the right hand resulting from recent fall.  She denies appetite or energy changes, bone pain.    BHx  ; 1st at 17  Menses 16  Menopause 53  HRT, OCP (-)  FHx: +  Cup 42D    Review of Systems   Constitutional: Negative for appetite change, chills, fever and unexpected weight change.   HENT:   Negative for lump/mass, mouth sores, sore throat, trouble swallowing and voice change.    Eyes: Negative for eye problems and icterus.   Respiratory: Negative for cough, hemoptysis and shortness of breath.    Cardiovascular: Negative for chest pain  and leg swelling.   Gastrointestinal: Negative for abdominal pain, constipation, diarrhea, nausea and vomiting.   Genitourinary: Negative for dysuria, frequency, hematuria, nocturia and vaginal bleeding.    Musculoskeletal: Negative for back pain, gait problem, neck pain and neck stiffness.   Neurological: Negative for extremity weakness, gait problem, headaches, numbness and seizures.   Hematological: Negative for adenopathy.     Past Medical History:   Diagnosis Date    Anxiety     Asthma     occ problems    Cancer 11/2019    right breast- surg scheduled    Constipation     Depression     Diabetes mellitus, type 2     GERD (gastroesophageal reflux disease)     Hyperlipidemia     Hypertension     x yrs    Prediabetes      Past Surgical History:   Procedure Laterality Date    BREAST BIOPSY Right 11/2019    SENTINEL LYMPH NODE BIOPSY Right 12/26/2019    Procedure: BIOPSY, LYMPH NODE, SENTINEL;  Surgeon: Luis Manuel Rios MD;  Location: [facility];  Service: General;  Laterality: Right;  SENTINEL @ 9A    TUBAL LIGATION  1938    VAGINAL DELIVERY      x 2     Social History     Socioeconomic History    Marital status:      Spouse name: Not on file    Number of children: Not on file    Years of education: Not on file    Highest education level: Not on file   Occupational History    Not on file   Social Needs    Financial resource strain: Not on file    Food insecurity:     Worry: Not on file     Inability: Not on file    Transportation needs:     Medical: Not on file     Non-medical: Not on file   Tobacco Use    Smoking status: Current Some Day Smoker     Packs/day: 0.25     Types: Cigarettes    Smokeless tobacco: Never Used    Tobacco comment: trying   Substance and Sexual Activity    Alcohol use: Yes     Comment: Socially     Drug use: Yes     Types: Marijuana     Comment: occ    Sexual activity: Not on file   Lifestyle    Physical activity:     Days per week: Not on file     Minutes  per session: Not on file    Stress: Not on file   Relationships    Social connections:     Talks on phone: Not on file     Gets together: Not on file     Attends Mandaeism service: Not on file     Active member of club or organization: Not on file     Attends meetings of clubs or organizations: Not on file     Relationship status: Not on file   Other Topics Concern    Not on file   Social History Narrative    Not on file     Family History   Problem Relation Age of Onset    Breast cancer Sister     Breast cancer Maternal Aunt     Breast cancer Other     Pancreatic cancer Mother        PRIOR HISTORY OF CHEMOTHERAPY OR RADIOTHERAPY: Please see HPI for patients prior oncologic history.    Medication List with Changes/Refills   Current Medications    ALBUTEROL (PROVENTIL HFA) 90 MCG/ACTUATION INHALER    Proventil HFA 90 mcg/actuation aerosol inhaler   Inhale 2 puffs every 4 hours by inhalation route as needed for SOB for 30 days.    AMLODIPINE (NORVASC) 10 MG TABLET    Take 10 mg by mouth once daily.    ASCORBIC ACID, VITAMIN C, (VITAMIN C) 500 MG TABLET    Take 500 mg by mouth once daily.    FLUOXETINE 40 MG CAPSULE    Take 1 capsule by mouth once daily.    HYDROCHLOROTHIAZIDE (HYDRODIURIL) 25 MG TABLET    Take 1 tablet by mouth once daily.    LORAZEPAM (ATIVAN) 0.5 MG TABLET    Take 0.5 mg by mouth 2 (two) times daily as needed.     METFORMIN (GLUCOPHAGE) 1000 MG TABLET    Take 1 tablet by mouth every evening.    MONTELUKAST (SINGULAIR) 10 MG TABLET    Take 10 mg by mouth once daily.     OMEPRAZOLE (PRILOSEC) 40 MG CAPSULE    Take 1 capsule by mouth before breakfast.    ROSUVASTATIN (CRESTOR) 10 MG TABLET    Take 1 tablet by mouth once daily.    VITAMIN D (VITAMIN D3) 1000 UNITS TAB    Take 1,000 Units by mouth once daily.     Review of patient's allergies indicates:   Allergen Reactions    Amoxicillin Nausea Only and Other (See Comments)       QUALITY OF LIFE: 100%- Normal, No Complaints, No Evidence of  Disease    Vitals:    01/31/20 0850   BP: 116/83   Pulse: 86   Resp: 18   Temp: 98 °F (36.7 °C)   SpO2: 96%   Weight: 94.3 kg (208 lb)   PainSc: 0-No pain     Body mass index is 33.57 kg/m².    PHYSICAL EXAM:   GENERAL: alert; in no apparent distress.   HEAD: normocephalic, atraumatic.  EYES: pupils are equal, round, reactive to light and accommodation. Sclera anicteric. Conjunctiva not injected.   NOSE/THROAT: no nasal erythema or rhinorrhea. Oropharynx pink, without erythema, ulcerations or thrush.   NECK: no cervical motion rigidity; supple with no masses.  CHEST:  Patient is speaking comfortably on room air with normal work of breathing without using accessory muscles of respiration.  ABDOMEN: soft, nontender, nondistended.   MUSCULOSKELETAL: no tenderness to palpation along the spine or scapulae. Normal range of motion.  NEUROLOGIC: cranial nerves II-XII intact bilaterally. Strength 5/5 in bilateral upper and lower extremities. No sensory deficits appreciated. Normal gait.  LYMPHATIC: no  axillary adenopathy appreciated bilaterally.   EXTREMITIES: no clubbing, cyanosis, edema.  SKIN: no erythema, rashes, ulcerations noted.   BREAST:  Right breast, nontender to palpation with residual hematoma, resolving.  Incision is clean dry and intact.  No cellulitis or fluctuance.  Large cup size with significant IM fold overlap    REVIEW OF IMAGING/PATHOLOGY/LABS: Please see HPI. All images reviewed personally by dictating physician.     ASSESSMENT: 59 y.o. female with stage IA, pT1cN0(sn)M0 g1 IDC R breast, ER+/TN+/her2(-) status post margin negative lumpectomy and low Oncotype DX score.  PLAN:  Claire Mcnamara presents with a screen detected malignancy, biopsy-proven grade 1 invasive ductal carcinoma that was estrogen receptor positive with mildly prominent, nonpathologic right axillary adenopathy status post margin negative lumpectomy and negative sentinel lymph node assessment with low Oncotype DX score.  She is planned  for antiestrogen therapy under the care of Dr. Chris.    Today I explained the role of radiotherapy is to eradicate residual cancer cells within the breast following lumpectomy thereby providing equivalent oncologic results to mastectomy, using a breast conserving protocol.  I provided diagrams illustrated in the tangential beam approach which allows sparing of the underlying lung and heart.  The patient has a large cup size and significant chest wall separation and therefore I recommend standard fractionated radiotherapy, 5040 cGy to the right breast followed by a 1000 cGy boost to the lumpectomy cavity for a total dose of 6040 cGy to that site using 3D conformal techniques.  The patient asked several questions which I did my best to address and at the end of our consultation would like to proceed with treatment.    I carefully explained the process of simulation and treatment delivery with weekly physician visits.     We discussed the risks and benefits of the above treatment and have gone over in detail the acute and late toxicities of radiation therapy to the right breast. The patient expressed  understanding and has signed a consent form which is included in the patients chart.     The patient has our contact information and understands that they are free to contact us at any time with questions or concerns regarding radiation therapy.    DISPOSITION: RTC FOR CT SIM    I have personally seen and evaluated this patient. Greater than 50% of this time was spent discussing coordination of care and/or counseling.    PHYSICIAN: Liang Grier Jr, MD    Thank you for the opportunity to meet and consult with Claier Mcnamara.   Please feel free to contact me to discuss the above recommendation further.

## 2020-01-31 NOTE — PROGRESS NOTES
Claire Mcnamara  [id number]  1960 1/31/2020  No referring provider defined for this encounter.    DIAGNOSIS: Cancer Staging  Malignant neoplasm of central portion of right breast in female, estrogen receptor positive  Staging form: Breast, AJCC 8th Edition  - Pathologic: Stage IA (pT1c, pN0(sn), cM0, G1, ER+, AZ+, HER2-) - Signed by Liang Grier Jr., MD on 1/31/2020      TREATMENT SITE(S): right breast    INTENT: CURATIVE    TREATMENT SETTING: ADJUVANT     MODALITY: PHOTON    TECHNIQUE:  3D CONFORMAL RADIOTHERAPY (3DCRT) with DIODES    IMRT MEDICAL NECESSITY: N/A    I have personally performed treatment planning for the patient, reviewing relevant history/physical and imaging. I have defined GTV, CTV, PTV and organs at risk.     In order to accomplish this plan, I am ordering:  SIMULATION: CT SIM FOR PLACEMENT OF TREATMENT FIELDS    CONTRAST: none    TO ACCOMPLISH REPRODUCIBLE POSITION: wing board, breast board    DEVICES FOR BEAM SHAPING: CUSTOMIZED MLC    CUSTOMIZED BOLUS: none    IMAGING: daily kv/kv OBI; weekly ports    I have ordered a weekly physics check.    SPECIAL PHYSICS CONSULT: NO  REASON: N/A    SPECIAL TREATMENT CIRCUMSTANCE: NO  Concurrent or recent administration of chemotherapeutic agents which are known potent radiosensitizers and thus will require vigilant monitoring for exaggerated radiation toxicities.    LABS: NONE    ANTICIPATED PRESCRIPTION TO ISOCENTER: 5040cGy/28frx to right breast + 1000cGy/5frx to lumpectomy (total 6040cGy/33frx)    ENERGY: 6/23X    TREATMENT: DAILY    PHYSICIAN: Liang Grier Jr, MD

## 2020-02-26 ENCOUNTER — DOCUMENTATION ONLY (OUTPATIENT)
Dept: RADIATION ONCOLOGY | Facility: CLINIC | Age: 60
End: 2020-02-26

## 2020-03-16 DIAGNOSIS — C50.111 MALIGNANT NEOPLASM OF CENTRAL PORTION OF RIGHT BREAST IN FEMALE, ESTROGEN RECEPTOR POSITIVE: Primary | ICD-10-CM

## 2020-03-16 DIAGNOSIS — Z17.0 MALIGNANT NEOPLASM OF CENTRAL PORTION OF RIGHT BREAST IN FEMALE, ESTROGEN RECEPTOR POSITIVE: Primary | ICD-10-CM

## 2020-03-16 RX ORDER — HYDROCODONE BITARTRATE AND ACETAMINOPHEN 5; 325 MG/1; MG/1
1 TABLET ORAL
Qty: 60 TABLET | Refills: 0 | Status: SHIPPED | OUTPATIENT
Start: 2020-03-16 | End: 2020-03-27 | Stop reason: SDUPTHER

## 2020-03-16 RX ORDER — SILVER SULFADIAZINE 10 G/1000G
CREAM TOPICAL 2 TIMES DAILY
Qty: 400 G | Refills: 2 | Status: SHIPPED | OUTPATIENT
Start: 2020-03-16 | End: 2022-01-12

## 2020-03-27 DIAGNOSIS — Z17.0 MALIGNANT NEOPLASM OF CENTRAL PORTION OF RIGHT BREAST IN FEMALE, ESTROGEN RECEPTOR POSITIVE: ICD-10-CM

## 2020-03-27 DIAGNOSIS — C50.111 MALIGNANT NEOPLASM OF CENTRAL PORTION OF RIGHT BREAST IN FEMALE, ESTROGEN RECEPTOR POSITIVE: ICD-10-CM

## 2020-03-27 RX ORDER — HYDROCODONE BITARTRATE AND ACETAMINOPHEN 5; 325 MG/1; MG/1
1 TABLET ORAL
Qty: 60 TABLET | Refills: 0 | Status: SHIPPED | OUTPATIENT
Start: 2020-03-27 | End: 2020-04-02 | Stop reason: SDUPTHER

## 2020-04-02 DIAGNOSIS — Z17.0 MALIGNANT NEOPLASM OF CENTRAL PORTION OF RIGHT BREAST IN FEMALE, ESTROGEN RECEPTOR POSITIVE: ICD-10-CM

## 2020-04-02 DIAGNOSIS — C50.111 MALIGNANT NEOPLASM OF CENTRAL PORTION OF RIGHT BREAST IN FEMALE, ESTROGEN RECEPTOR POSITIVE: ICD-10-CM

## 2020-04-02 RX ORDER — HYDROCODONE BITARTRATE AND ACETAMINOPHEN 5; 325 MG/1; MG/1
1 TABLET ORAL EVERY 8 HOURS PRN
Qty: 30 TABLET | Refills: 0 | Status: SHIPPED | OUTPATIENT
Start: 2020-04-02 | End: 2020-04-23 | Stop reason: SDUPTHER

## 2020-04-23 ENCOUNTER — DOCUMENTATION ONLY (OUTPATIENT)
Dept: RADIATION ONCOLOGY | Facility: CLINIC | Age: 60
End: 2020-04-23

## 2020-04-23 DIAGNOSIS — Z17.0 MALIGNANT NEOPLASM OF CENTRAL PORTION OF RIGHT BREAST IN FEMALE, ESTROGEN RECEPTOR POSITIVE: ICD-10-CM

## 2020-04-23 DIAGNOSIS — C50.111 MALIGNANT NEOPLASM OF CENTRAL PORTION OF RIGHT BREAST IN FEMALE, ESTROGEN RECEPTOR POSITIVE: ICD-10-CM

## 2020-04-23 RX ORDER — HYDROCODONE BITARTRATE AND ACETAMINOPHEN 5; 325 MG/1; MG/1
1 TABLET ORAL EVERY 8 HOURS PRN
Qty: 30 TABLET | Refills: 0 | Status: SHIPPED | OUTPATIENT
Start: 2020-04-23 | End: 2022-01-12

## 2020-04-23 NOTE — PROGRESS NOTES
DIAGNOSIS: IA, pT1cN0(sn)M0 g1 IDC R breast, ER+/NH+/her2(-)    TREATMENT  Patient completed adjuvant radiotherapy to the right breast, 5040 cGy followed by 1000 cGy boost to lumpectomy cavity for a total dose of 6040 cGy on April 6, 2020.  Treatment was generally well tolerated with expected radiation dermatitis and fatigue and patient continued to have low back pain requiring low-dose hydrocodone through treatment.    Contacted patient today for 3 week checkup.  Patient reports energy level has returned to 65%.  It continues to improve by the day.  She denies pain or discomfort in the right breast endorses good range of motion without swelling of the right upper extremity.  She is applying pure cocoa butter with benefit.  She has not yet followed up with Dr. Chris and does not have prescription for antiestrogen therapy.    A/P  1.  Patient did relatively well with radiotherapy given large cup size.  She did have grade 2 radiation dermatitis and reports this has completely subsided with minimal tanning which is also improving.  I recommended continue with cocoa butter and described massages of the right breast to minimize scar tissue but also range of motion exercises for the right upper extremity.  2.  Follow-up with [facility] and initiate antiestrogen therapy as prescribed.  3.  Return to clinic in 6 months.  Will place orders for 6 month mammogram.  4.  COVID-19 precautions discussed.  5.  Norco 5/325 #30 e-prescribed.

## 2020-04-24 ENCOUNTER — TELEPHONE (OUTPATIENT)
Dept: RADIATION ONCOLOGY | Facility: CLINIC | Age: 60
End: 2020-04-24

## 2020-04-24 DIAGNOSIS — C50.111 MALIGNANT NEOPLASM OF CENTRAL PORTION OF RIGHT BREAST IN FEMALE, ESTROGEN RECEPTOR POSITIVE: Primary | ICD-10-CM

## 2020-04-24 DIAGNOSIS — Z17.0 MALIGNANT NEOPLASM OF CENTRAL PORTION OF RIGHT BREAST IN FEMALE, ESTROGEN RECEPTOR POSITIVE: Primary | ICD-10-CM

## 2020-04-28 DIAGNOSIS — Z17.0 MALIGNANT NEOPLASM OF CENTRAL PORTION OF RIGHT BREAST IN FEMALE, ESTROGEN RECEPTOR POSITIVE: Primary | ICD-10-CM

## 2020-04-28 DIAGNOSIS — C50.111 MALIGNANT NEOPLASM OF CENTRAL PORTION OF RIGHT BREAST IN FEMALE, ESTROGEN RECEPTOR POSITIVE: Primary | ICD-10-CM

## 2020-04-28 RX ORDER — LETROZOLE 2.5 MG/1
2.5 TABLET, FILM COATED ORAL DAILY
COMMUNITY
End: 2020-04-28 | Stop reason: SDUPTHER

## 2020-04-28 NOTE — TELEPHONE ENCOUNTER
----- Message from Ramo Saenz MD sent at 4/28/2020 12:57 PM CDT -----  Sol,  Will you please call femara in for her and call her and let her know to start it.   ----- Message -----  From: Liang Grier Jr., MD  Sent: 4/23/2020   3:18 PM CDT  To: Ramo Saenz MD    Jose E--  Doesn't yet have anti-estrogen Rx barbraa [facility]          Spoke to Claire and discussed that Dany is now ready for her to start Femara 2.5mg daily. She is agreeable and script sent via e-scribe to [facility] Per her request.

## 2020-04-29 RX ORDER — LETROZOLE 2.5 MG/1
2.5 TABLET, FILM COATED ORAL DAILY
Qty: 30 TABLET | Refills: 5 | Status: SHIPPED | OUTPATIENT
Start: 2020-04-29 | End: 2021-06-07

## 2020-10-15 ENCOUNTER — HOSPITAL ENCOUNTER (OUTPATIENT)
Dept: RADIOLOGY | Facility: HOSPITAL | Age: 60
Discharge: HOME | End: 2020-10-15
Attending: RADIOLOGY
Payer: MEDICAID

## 2020-10-15 DIAGNOSIS — C50.111 MALIGNANT NEOPLASM OF CENTRAL PORTION OF RIGHT BREAST IN FEMALE, ESTROGEN RECEPTOR POSITIVE: ICD-10-CM

## 2020-10-15 DIAGNOSIS — Z17.0 MALIGNANT NEOPLASM OF CENTRAL PORTION OF RIGHT BREAST IN FEMALE, ESTROGEN RECEPTOR POSITIVE: ICD-10-CM

## 2020-10-15 PROCEDURE — 76642 ULTRASOUND BREAST LIMITED: CPT | Mod: TC,PO,RT

## 2020-10-15 PROCEDURE — 77062 BREAST TOMOSYNTHESIS BI: CPT | Mod: TC,PO

## 2020-10-26 ENCOUNTER — OFFICE VISIT (OUTPATIENT)
Dept: RADIATION ONCOLOGY | Facility: CLINIC | Age: 60
End: 2020-10-26
Payer: MEDICAID

## 2020-10-26 ENCOUNTER — TELEPHONE (OUTPATIENT)
Dept: RADIATION ONCOLOGY | Facility: CLINIC | Age: 60
End: 2020-10-26

## 2020-10-26 VITALS
TEMPERATURE: 99 F | SYSTOLIC BLOOD PRESSURE: 146 MMHG | BODY MASS INDEX: 34.01 KG/M2 | HEART RATE: 95 BPM | OXYGEN SATURATION: 97 % | DIASTOLIC BLOOD PRESSURE: 90 MMHG | WEIGHT: 210.69 LBS

## 2020-10-26 DIAGNOSIS — Z17.0 MALIGNANT NEOPLASM OF CENTRAL PORTION OF RIGHT BREAST IN FEMALE, ESTROGEN RECEPTOR POSITIVE: Primary | ICD-10-CM

## 2020-10-26 DIAGNOSIS — C50.111 MALIGNANT NEOPLASM OF CENTRAL PORTION OF RIGHT BREAST IN FEMALE, ESTROGEN RECEPTOR POSITIVE: Primary | ICD-10-CM

## 2020-10-26 PROCEDURE — 99214 OFFICE O/P EST MOD 30 MIN: CPT | Mod: S$GLB,,, | Performed by: RADIOLOGY

## 2020-10-26 PROCEDURE — 99214 PR OFFICE/OUTPT VISIT, EST, LEVL IV, 30-39 MIN: ICD-10-PCS | Mod: S$GLB,,, | Performed by: RADIOLOGY

## 2020-10-26 NOTE — PROGRESS NOTES
Claire Anders  [id number]  1960  10/26/2020  [facility]  [address]  [address]  [place] [address]    DIAGNOSIS: Cancer Staging  Malignant neoplasm of central portion of right breast in female, estrogen receptor positive  Staging form: Breast, AJCC 8th Edition  - Pathologic: Stage IA (pT1c, pN0(sn), cM0, G1, ER+, WI+, HER2-) - Signed by Liang Grier Jr., MD on 1/31/2020    REASON FOR VISIT: Routine scheduled follow-up.    HISTORY OF PRESENT ILLNESS:   59F presented with abnormal screening mammogram detecting a spiculated mass, 1.6 x 1.7 cm in the right breast with ultrasound confirming a 1.6 x 1.5 x 0.5 cm irregular, ill-defined, spiculated mass at the right breast, 1:00, 9 cm from the nipple.  Mildly prominent right axillary adenopathy was appreciated, largest 1.3 and 1.8 cm in size.  Core needle biopsy of the right breast returned grade 1 invasive ductal carcinoma, [place] 4/9, ER+ @ 99%, WI+ @ 96%, her2 (-).    Dr. Rios took the patient for right lumpectomy and sentinel lymph node assessment that revealed:   - 1.6 cm grade 1 invasive ductal carcinoma, [place] 4/9, margin negative at 2 mm   - low/intermediate grade DCIS, cribriform, margin negative at 10 mm   - 0/1 SLN    She met with Dr. Chris who ordered Oncotype DX testing returned score 12 and recommended adjuvant radiotherapy and antiestrogen therapy (AI), no chemotherapy indicated.    Patient recently had a fall with resultant hematoma of the right breast status post aspiration by Dr. Rios.    Patient completed adjuvant radiotherapy to the right breast, 5040 cGy followed by 1000 cGy boost to lumpectomy cavity for a total dose of 6040 cGy on April 6, 2020.  Treatment was generally well tolerated with expected radiation dermatitis and fatigue and patient continued to have low back pain requiring low-dose hydrocodone through treatment.    On femara.    INTERVAL HISTORY:   Today patient reports occasional pain twinges within  the right breast.  She denies swelling or decreased range of motion of the right upper extremity.  She denies appetite, energy changes.  She endorses weight gain, rare hot flashes and denies arthralgias.    Review of systems otherwise negative unless indicated in HPI/interval history.    Past Medical History:   Diagnosis Date    Anxiety     Asthma     occ problems    Breast cancer     right    Cancer 11/2019    right breast- surg scheduled    Constipation     Depression     Diabetes mellitus, type 2     GERD (gastroesophageal reflux disease)     Hyperlipidemia     Hypertension     x yrs    Prediabetes      Past Surgical History:   Procedure Laterality Date    BREAST BIOPSY Right 11/2019    SENTINEL LYMPH NODE BIOPSY Right 12/26/2019    Procedure: BIOPSY, LYMPH NODE, SENTINEL;  Surgeon: Luis Manuel Rios MD;  Location: [facility];  Service: General;  Laterality: Right;  SENTINEL @ 9A    TUBAL LIGATION  1938    VAGINAL DELIVERY      x 2     Social History     Socioeconomic History    Marital status:      Spouse name: Not on file    Number of children: Not on file    Years of education: Not on file    Highest education level: Not on file   Occupational History    Not on file   Social Needs    Financial resource strain: Not on file    Food insecurity     Worry: Not on file     Inability: Not on file    Transportation needs     Medical: Not on file     Non-medical: Not on file   Tobacco Use    Smoking status: Current Some Day Smoker     Packs/day: 0.25     Types: Cigarettes    Smokeless tobacco: Never Used    Tobacco comment: trying   Substance and Sexual Activity    Alcohol use: Yes     Comment: Socially     Drug use: Yes     Types: Marijuana     Comment: occ    Sexual activity: Not on file   Lifestyle    Physical activity     Days per week: Not on file     Minutes per session: Not on file    Stress: Not on file   Relationships    Social connections     Talks on phone: Not on file      Gets together: Not on file     Attends [employer] service: Not on file     Active member of club or organization: Not on file     Attends meetings of clubs or organizations: Not on file     Relationship status: Not on file   Other Topics Concern    Not on file   Social History Narrative    Not on file     Family History   Problem Relation Age of Onset    Breast cancer Sister     Breast cancer Maternal Aunt     Breast cancer Other     Pancreatic cancer Mother      Medication List with Changes/Refills   Current Medications    ALBUTEROL (PROVENTIL HFA) 90 MCG/ACTUATION INHALER    Proventil HFA 90 mcg/actuation aerosol inhaler   Inhale 2 puffs every 4 hours by inhalation route as needed for SOB for 30 days.    AMLODIPINE (NORVASC) 10 MG TABLET    Take 10 mg by mouth once daily.    ASCORBIC ACID, VITAMIN C, (VITAMIN C) 500 MG TABLET    Take 500 mg by mouth once daily.    FLUOXETINE 40 MG CAPSULE    Take 1 capsule by mouth once daily.    HYDROCHLOROTHIAZIDE (HYDRODIURIL) 25 MG TABLET    Take 1 tablet by mouth once daily.    HYDROCODONE-ACETAMINOPHEN (NORCO) 5-325 MG PER TABLET    Take 1 tablet by mouth every 8 (eight) hours as needed for Pain.    LETROZOLE (FEMARA) 2.5 MG TAB    Take 1 tablet (2.5 mg total) by mouth once daily.    LORAZEPAM (ATIVAN) 0.5 MG TABLET    Take 0.5 mg by mouth 2 (two) times daily as needed.     METFORMIN (GLUCOPHAGE) 1000 MG TABLET    Take 1 tablet by mouth every evening.    MONTELUKAST (SINGULAIR) 10 MG TABLET    Take 10 mg by mouth once daily.     OMEPRAZOLE (PRILOSEC) 40 MG CAPSULE    Take 1 capsule by mouth before breakfast.    ROSUVASTATIN (CRESTOR) 10 MG TABLET    Take 1 tablet by mouth once daily.    SILVER SULFADIAZINE 1% (SILVADENE) 1 % CREAM    Apply topically 2 (two) times daily.    VITAMIN D (VITAMIN D3) 1000 UNITS TAB    Take 1,000 Units by mouth once daily.     Review of patient's allergies indicates:   Allergen Reactions    Amoxicillin Nausea Only and Other (See Comments)        QUALITY OF LIFE: 100%- Normal, No Complaints, No Evidence of Disease    Vitals:    10/26/20 1035   BP: (!) 146/90   Pulse: 95   Temp: 98.5 °F (36.9 °C)   SpO2: 97%   Weight: 95.6 kg (210 lb 11.2 oz)   PainSc: 0-No pain     Body mass index is 34.01 kg/m².    PHYSICAL EXAM:   GENERAL: alert; in no apparent distress.   HEAD: normocephalic, atraumatic.  EYES: pupils are equal, round, reactive to light and accommodation. Sclera anicteric. Conjunctiva not injected.   NECK: no cervical motion rigidity; supple with no masses.  CHEST:  Patient is speaking comfortably on room air with normal work of breathing without using accessory muscles of respiration.  ABDOMEN: soft, nontender, nondistended.   MUSCULOSKELETAL: no tenderness to palpation along the spine or scapulae. Normal range of motion.  NEUROLOGIC: cranial nerves II-XII intact bilaterally. Strength 5/5 in bilateral upper and lower extremities. No sensory deficits appreciated.  Normal gait.  LYMPHATIC: no right axillary adenopathy appreciated.   EXTREMITIES: no clubbing, cyanosis, edema.  SKIN:  Patchy hyperpigmentation face  BREAST:  Volume reduction of right breast with palpable scar tissue at lumpectomy site.  No fibrosis, cellulitis or fluctuance.  Mild tanning    ANCILLARY DATA:   10/20 MMG  Impression:  Postsurgical changes in the 1 o'clock position of the right breast.  No mammographic evidence of malignancy in either breast  Follow-up right mammogram and ultrasound in 6 months is recommended  BI-RADS CATEGORY: 3  PROBABLY BENIGN FINDING - SHORT TERM INTERVAL FOLLOWUP SUGGESTED.    ASSESSMENT: 60 y.o. female with stage IA, pT1cN0(sn)M0 g1 IDC R breast, ER+/DC+/her2(-) status post margin negative lumpectomy and low Oncotype DX score followed by adjuvant radiotherapy to 6040 cGy ending April 6, 2020; on Femara  PLAN:   Claire Mcnamara continues to do very well.  She has a negative exam today with clear mammogram from this month.  Will order repeat in 6  months.  She reports good tolerance of Femara.  I recommended continue to massage the right breast and offered suggestion of additional padding to the right side given asymmetry.  She will return to clinic in 6 months.    All questions answered and contact information provided. Patient understands free to call us anytime with any questions or concerns regarding radiation therapy.    I have personally seen and evaluated this patient. Greater than 50% of this time was spent discussing coordination of care and/or counseling.    PHYSICIAN: Liang Grier Jr, MD

## 2020-10-27 ENCOUNTER — TELEPHONE (OUTPATIENT)
Dept: DERMATOLOGY | Facility: CLINIC | Age: 60
End: 2020-10-27

## 2020-10-27 NOTE — TELEPHONE ENCOUNTER
----- Message from Suki Santos sent at 10/27/2020  8:15 AM CDT -----  Pt called to set up np appointment pt stated she was referred by her Doctor please reach out to pt at [phone]

## 2020-10-27 NOTE — TELEPHONE ENCOUNTER
Informed Provider is not accepting new medicaid at this time. Verbalized understanding. Pt given Dr Coelho office number in [place], [phone]

## 2021-02-25 ENCOUNTER — LAB VISIT (OUTPATIENT)
Dept: LAB | Facility: HOSPITAL | Age: 61
End: 2021-02-25
Attending: FAMILY MEDICINE
Payer: MEDICAID

## 2021-02-25 DIAGNOSIS — M54.6 PAIN IN THORACIC SPINE: ICD-10-CM

## 2021-02-25 DIAGNOSIS — M54.50 LUMBAGO: ICD-10-CM

## 2021-02-25 DIAGNOSIS — F41.1 GENERALIZED ANXIETY DISORDER: ICD-10-CM

## 2021-02-25 DIAGNOSIS — J30.9 ATOPIC RHINITIS: ICD-10-CM

## 2021-02-25 DIAGNOSIS — E78.5 HYPERLIPEMIA: ICD-10-CM

## 2021-02-25 DIAGNOSIS — E11.9 DIABETES MELLITUS WITHOUT COMPLICATION: ICD-10-CM

## 2021-02-25 DIAGNOSIS — Z13.29 SCREENING FOR THYROID DISORDER: ICD-10-CM

## 2021-02-25 DIAGNOSIS — S60.921A SUPERFICIAL INJURY OF RIGHT HAND: ICD-10-CM

## 2021-02-25 DIAGNOSIS — K21.9 ESOPHAGEAL REFLUX: ICD-10-CM

## 2021-02-25 DIAGNOSIS — M79.641 RIGHT HAND PAIN: ICD-10-CM

## 2021-02-25 DIAGNOSIS — M79.641 RIGHT HAND PAIN: Primary | ICD-10-CM

## 2021-02-25 DIAGNOSIS — S60.921A SUPERFICIAL INJURY OF RIGHT HAND: Primary | ICD-10-CM

## 2021-02-25 DIAGNOSIS — Z79.899 ENCOUNTER FOR LONG-TERM (CURRENT) USE OF OTHER MEDICATIONS: Primary | ICD-10-CM

## 2021-02-25 LAB
25(OH)D3+25(OH)D2 SERPL-MCNC: <7 NG/ML (ref 30–96)
FOLATE SERPL-MCNC: 5.8 NG/ML (ref 4–24)
MAGNESIUM SERPL-MCNC: 1.8 MG/DL (ref 1.6–2.6)
TSH SERPL DL<=0.005 MIU/L-ACNC: 2.24 UIU/ML (ref 0.34–5.6)
VIT B12 SERPL-MCNC: 196 PG/ML (ref 210–950)

## 2021-02-25 PROCEDURE — 80061 LIPID PANEL: CPT | Mod: 59

## 2021-02-25 PROCEDURE — 82306 VITAMIN D 25 HYDROXY: CPT

## 2021-02-25 PROCEDURE — 82746 ASSAY OF FOLIC ACID SERUM: CPT

## 2021-02-25 PROCEDURE — 83735 ASSAY OF MAGNESIUM: CPT

## 2021-02-25 PROCEDURE — 84443 ASSAY THYROID STIM HORMONE: CPT

## 2021-02-25 PROCEDURE — 82607 VITAMIN B-12: CPT

## 2021-02-26 ENCOUNTER — HOSPITAL ENCOUNTER (OUTPATIENT)
Dept: RADIOLOGY | Facility: HOSPITAL | Age: 61
Discharge: HOME | End: 2021-02-26
Attending: FAMILY MEDICINE
Payer: MEDICAID

## 2021-02-26 DIAGNOSIS — S60.921A SUPERFICIAL INJURY OF RIGHT HAND: ICD-10-CM

## 2021-02-26 DIAGNOSIS — M79.641 RIGHT HAND PAIN: ICD-10-CM

## 2021-02-26 DIAGNOSIS — M54.6 PAIN IN THORACIC SPINE: ICD-10-CM

## 2021-02-26 DIAGNOSIS — M54.50 LUMBAGO: ICD-10-CM

## 2021-02-26 PROCEDURE — 72100 X-RAY EXAM L-S SPINE 2/3 VWS: CPT | Mod: TC,PO

## 2021-02-26 PROCEDURE — 73130 X-RAY EXAM OF HAND: CPT | Mod: TC,PO,RT

## 2021-02-26 PROCEDURE — 72050 X-RAY EXAM NECK SPINE 4/5VWS: CPT | Mod: TC,PO

## 2021-03-24 LAB
CHOLEST SERPL-MCNC: 244 MG/DL (ref 100–199)
HDL SERPL-SCNC: 33.5 UMOL/L
HDLC SERPL-MCNC: 54 MG/DL
LDL SERPL QN: 21.2 NM
LDL SERPL-SCNC: 1974 NMOL/L
LDL SMALL SERPL-SCNC: 877 NMOL/L
LDLC SERPL CALC-MCNC: ABNORMAL MG/DL
LP-IR SCORE SERPL: 57
TRIGL SERPL-MCNC: 251 MG/DL (ref 0–149)

## 2021-04-23 ENCOUNTER — HOSPITAL ENCOUNTER (OUTPATIENT)
Dept: RADIOLOGY | Facility: HOSPITAL | Age: 61
Discharge: HOME | End: 2021-04-23
Attending: RADIOLOGY
Payer: MEDICAID

## 2021-04-23 DIAGNOSIS — Z17.0 MALIGNANT NEOPLASM OF CENTRAL PORTION OF RIGHT BREAST IN FEMALE, ESTROGEN RECEPTOR POSITIVE: ICD-10-CM

## 2021-04-23 DIAGNOSIS — C50.111 MALIGNANT NEOPLASM OF CENTRAL PORTION OF RIGHT BREAST IN FEMALE, ESTROGEN RECEPTOR POSITIVE: ICD-10-CM

## 2021-04-23 PROCEDURE — 77061 BREAST TOMOSYNTHESIS UNI: CPT | Mod: TC,PO,RT

## 2021-10-11 DIAGNOSIS — Z17.0 MALIGNANT NEOPLASM OF CENTRAL PORTION OF RIGHT BREAST IN FEMALE, ESTROGEN RECEPTOR POSITIVE: Primary | ICD-10-CM

## 2021-10-11 DIAGNOSIS — C50.111 MALIGNANT NEOPLASM OF CENTRAL PORTION OF RIGHT BREAST IN FEMALE, ESTROGEN RECEPTOR POSITIVE: Primary | ICD-10-CM

## 2021-11-08 ENCOUNTER — HOSPITAL ENCOUNTER (OUTPATIENT)
Dept: RADIOLOGY | Facility: HOSPITAL | Age: 61
Discharge: HOME | End: 2021-11-08
Attending: RADIOLOGY
Payer: MEDICAID

## 2021-11-08 VITALS — BODY MASS INDEX: 33.87 KG/M2 | HEIGHT: 66 IN | WEIGHT: 210.75 LBS

## 2021-11-08 DIAGNOSIS — C50.111 MALIGNANT NEOPLASM OF CENTRAL PORTION OF RIGHT BREAST IN FEMALE, ESTROGEN RECEPTOR POSITIVE: ICD-10-CM

## 2021-11-08 DIAGNOSIS — Z17.0 MALIGNANT NEOPLASM OF CENTRAL PORTION OF RIGHT BREAST IN FEMALE, ESTROGEN RECEPTOR POSITIVE: ICD-10-CM

## 2021-11-08 PROCEDURE — 77066 DX MAMMO INCL CAD BI: CPT | Mod: TC,PO

## 2021-11-10 ENCOUNTER — TELEPHONE (OUTPATIENT)
Dept: RADIATION ONCOLOGY | Facility: CLINIC | Age: 61
End: 2021-11-10
Payer: MEDICAID

## 2021-11-10 DIAGNOSIS — C50.111 MALIGNANT NEOPLASM OF CENTRAL PORTION OF RIGHT BREAST IN FEMALE, ESTROGEN RECEPTOR POSITIVE: Primary | ICD-10-CM

## 2021-11-10 DIAGNOSIS — Z17.0 MALIGNANT NEOPLASM OF CENTRAL PORTION OF RIGHT BREAST IN FEMALE, ESTROGEN RECEPTOR POSITIVE: Primary | ICD-10-CM

## 2022-01-12 ENCOUNTER — HOSPITAL ENCOUNTER (INPATIENT)
Facility: HOSPITAL | Age: 62
LOS: 4 days | Discharge: HOME | DRG: 439 | End: 2022-01-16
Attending: EMERGENCY MEDICINE | Admitting: FAMILY MEDICINE
Payer: MEDICAID

## 2022-01-12 DIAGNOSIS — K81.9 CHOLECYSTITIS: ICD-10-CM

## 2022-01-12 DIAGNOSIS — R10.9 ABDOMINAL PAIN: Primary | ICD-10-CM

## 2022-01-12 LAB
ALBUMIN SERPL BCP-MCNC: 3.6 G/DL (ref 3.5–5.2)
ALLENS TEST: ABNORMAL
ALP SERPL-CCNC: 91 U/L (ref 55–135)
ALT SERPL W/O P-5'-P-CCNC: 29 U/L (ref 10–44)
AMPHET+METHAMPHET UR QL: NEGATIVE
ANION GAP SERPL CALC-SCNC: 20 MMOL/L (ref 8–16)
AST SERPL-CCNC: 68 U/L (ref 10–40)
B-OH-BUTYR BLD STRIP-SCNC: 0.2 MMOL/L (ref 0–0.5)
BACTERIA #/AREA URNS HPF: NEGATIVE /HPF
BARBITURATES UR QL SCN>200 NG/ML: NEGATIVE
BASOPHILS # BLD AUTO: 0.06 K/UL (ref 0–0.2)
BASOPHILS NFR BLD: 0.5 % (ref 0–1.9)
BENZODIAZ UR QL SCN>200 NG/ML: NEGATIVE
BILIRUB SERPL-MCNC: 1.3 MG/DL (ref 0.1–1)
BILIRUB UR QL STRIP: NEGATIVE
BILIRUB UR QL STRIP: NEGATIVE
BNP SERPL-MCNC: 62 PG/ML (ref 0–99)
BUN SERPL-MCNC: 18 MG/DL (ref 8–23)
BZE UR QL SCN: NEGATIVE
CALCIUM SERPL-MCNC: 7.9 MG/DL (ref 8.7–10.5)
CANNABINOIDS UR QL SCN: ABNORMAL
CHLORIDE SERPL-SCNC: 72 MMOL/L (ref 95–110)
CLARITY UR: CLEAR
CLARITY UR: CLEAR
CO2 SERPL-SCNC: 33 MMOL/L (ref 23–29)
COLOR UR: YELLOW
COLOR UR: YELLOW
CREAT SERPL-MCNC: 1.7 MG/DL (ref 0.5–1.4)
CREAT UR-MCNC: 41 MG/DL (ref 15–325)
DELSYS: ABNORMAL
DIFFERENTIAL METHOD: ABNORMAL
EOSINOPHIL # BLD AUTO: 0.1 K/UL (ref 0–0.5)
EOSINOPHIL NFR BLD: 0.7 % (ref 0–8)
ERYTHROCYTE [DISTWIDTH] IN BLOOD BY AUTOMATED COUNT: 12.4 % (ref 11.5–14.5)
ERYTHROCYTE [SEDIMENTATION RATE] IN BLOOD BY WESTERGREN METHOD: 19 MM/H
EST. GFR  (AFRICAN AMERICAN): 36.7 ML/MIN/1.73 M^2
EST. GFR  (NON AFRICAN AMERICAN): 31.9 ML/MIN/1.73 M^2
ETHANOL SERPL-MCNC: <5 MG/DL
FIO2: 21
GLUCOSE SERPL-MCNC: 103 MG/DL (ref 70–110)
GLUCOSE UR QL STRIP: NEGATIVE
GLUCOSE UR QL STRIP: NEGATIVE
HCO3 UR-SCNC: 40.4 MMOL/L (ref 24–28)
HCT VFR BLD AUTO: 35.5 % (ref 37–48.5)
HGB BLD-MCNC: 12.4 G/DL (ref 12–16)
HGB UR QL STRIP: ABNORMAL
HGB UR QL STRIP: ABNORMAL
HYALINE CASTS #/AREA URNS LPF: 1 /LPF
IMM GRANULOCYTES # BLD AUTO: 0.08 K/UL (ref 0–0.04)
IMM GRANULOCYTES NFR BLD AUTO: 0.7 % (ref 0–0.5)
INR PPP: 1
KETONES UR QL STRIP: NEGATIVE
KETONES UR QL STRIP: NEGATIVE
LACTATE SERPL-SCNC: 3.2 MMOL/L (ref 0.5–1.9)
LEUKOCYTE ESTERASE UR QL STRIP: ABNORMAL
LEUKOCYTE ESTERASE UR QL STRIP: ABNORMAL
LIPASE SERPL-CCNC: 134 U/L (ref 4–60)
LYMPHOCYTES # BLD AUTO: 1.7 K/UL (ref 1–4.8)
LYMPHOCYTES NFR BLD: 14.2 % (ref 18–48)
MAGNESIUM SERPL-MCNC: 1.7 MG/DL (ref 1.6–2.6)
MCH RBC QN AUTO: 30.2 PG (ref 27–31)
MCHC RBC AUTO-ENTMCNC: 34.9 G/DL (ref 32–36)
MCV RBC AUTO: 86 FL (ref 82–98)
MICROSCOPIC COMMENT: ABNORMAL
MODE: ABNORMAL
MONOCYTES # BLD AUTO: 0.8 K/UL (ref 0.3–1)
MONOCYTES NFR BLD: 6.9 % (ref 4–15)
NEUTROPHILS # BLD AUTO: 9.4 K/UL (ref 1.8–7.7)
NEUTROPHILS NFR BLD: 77 % (ref 38–73)
NITRITE UR QL STRIP: NEGATIVE
NITRITE UR QL STRIP: NEGATIVE
NRBC BLD-RTO: 0 /100 WBC
OPIATES UR QL SCN: NEGATIVE
PCO2 BLDA: 57.7 MMHG (ref 35–45)
PCP UR QL SCN>25 NG/ML: NEGATIVE
PH SMN: 7.45 [PH] (ref 7.35–7.45)
PH UR STRIP: 7 [PH] (ref 5–8)
PH UR STRIP: 7 [PH] (ref 5–8)
PLATELET # BLD AUTO: 248 K/UL (ref 150–450)
PMV BLD AUTO: 9.7 FL (ref 9.2–12.9)
PO2 BLDA: 23 MMHG (ref 40–60)
POC BE: 17 MMOL/L
POC SATURATED O2: 40 % (ref 95–100)
POC TCO2: 42 MMOL/L (ref 24–29)
POTASSIUM SERPL-SCNC: 1.5 MMOL/L (ref 3.5–5.1)
PROT SERPL-MCNC: 7.8 G/DL (ref 6–8.4)
PROT UR QL STRIP: ABNORMAL
PROT UR QL STRIP: ABNORMAL
PROTHROMBIN TIME: 12.9 SEC (ref 11.4–13.7)
RBC # BLD AUTO: 4.11 M/UL (ref 4–5.4)
RBC #/AREA URNS HPF: 2 /HPF (ref 0–4)
SAMPLE: ABNORMAL
SARS-COV-2 RDRP RESP QL NAA+PROBE: NEGATIVE
SITE: ABNORMAL
SODIUM SERPL-SCNC: 125 MMOL/L (ref 136–145)
SP GR UR STRIP: 1 (ref 1–1.03)
SP GR UR STRIP: 1 (ref 1–1.03)
SP02: 96
SQUAMOUS #/AREA URNS HPF: 9 /HPF
TOXICOLOGY INFORMATION: ABNORMAL
TROPONIN I SERPL DL<=0.01 NG/ML-MCNC: 0.04 NG/ML
TSH SERPL DL<=0.005 MIU/L-ACNC: 1.33 UIU/ML (ref 0.34–5.6)
URN SPEC COLLECT METH UR: ABNORMAL
URN SPEC COLLECT METH UR: ABNORMAL
UROBILINOGEN UR STRIP-ACNC: NEGATIVE EU/DL
UROBILINOGEN UR STRIP-ACNC: NEGATIVE EU/DL
WBC # BLD AUTO: 12.18 K/UL (ref 3.9–12.7)
WBC #/AREA URNS HPF: 9 /HPF (ref 0–5)

## 2022-01-12 PROCEDURE — 82803 BLOOD GASES ANY COMBINATION: CPT

## 2022-01-12 PROCEDURE — 82010 KETONE BODYS QUAN: CPT | Performed by: EMERGENCY MEDICINE

## 2022-01-12 PROCEDURE — 36000 PLACE NEEDLE IN VEIN: CPT

## 2022-01-12 PROCEDURE — 25000003 PHARM REV CODE 250: Performed by: EMERGENCY MEDICINE

## 2022-01-12 PROCEDURE — 84484 ASSAY OF TROPONIN QUANT: CPT | Performed by: EMERGENCY MEDICINE

## 2022-01-12 PROCEDURE — 83605 ASSAY OF LACTIC ACID: CPT | Performed by: EMERGENCY MEDICINE

## 2022-01-12 PROCEDURE — 93010 ELECTROCARDIOGRAM REPORT: CPT | Mod: ,,, | Performed by: INTERNAL MEDICINE

## 2022-01-12 PROCEDURE — 82077 ASSAY SPEC XCP UR&BREATH IA: CPT | Performed by: EMERGENCY MEDICINE

## 2022-01-12 PROCEDURE — 20000000 HC ICU ROOM

## 2022-01-12 PROCEDURE — 85025 COMPLETE CBC W/AUTO DIFF WBC: CPT | Performed by: EMERGENCY MEDICINE

## 2022-01-12 PROCEDURE — 93005 ELECTROCARDIOGRAM TRACING: CPT | Performed by: INTERNAL MEDICINE

## 2022-01-12 PROCEDURE — 87040 BLOOD CULTURE FOR BACTERIA: CPT | Mod: 59 | Performed by: EMERGENCY MEDICINE

## 2022-01-12 PROCEDURE — 83735 ASSAY OF MAGNESIUM: CPT | Performed by: EMERGENCY MEDICINE

## 2022-01-12 PROCEDURE — 83690 ASSAY OF LIPASE: CPT | Performed by: EMERGENCY MEDICINE

## 2022-01-12 PROCEDURE — 84443 ASSAY THYROID STIM HORMONE: CPT | Performed by: EMERGENCY MEDICINE

## 2022-01-12 PROCEDURE — 63600175 PHARM REV CODE 636 W HCPCS: Performed by: EMERGENCY MEDICINE

## 2022-01-12 PROCEDURE — 93010 EKG 12-LEAD: ICD-10-PCS | Mod: ,,, | Performed by: INTERNAL MEDICINE

## 2022-01-12 PROCEDURE — U0002 COVID-19 LAB TEST NON-CDC: HCPCS | Performed by: EMERGENCY MEDICINE

## 2022-01-12 PROCEDURE — 80307 DRUG TEST PRSMV CHEM ANLYZR: CPT | Performed by: EMERGENCY MEDICINE

## 2022-01-12 PROCEDURE — 85610 PROTHROMBIN TIME: CPT | Performed by: EMERGENCY MEDICINE

## 2022-01-12 PROCEDURE — 99291 CRITICAL CARE FIRST HOUR: CPT

## 2022-01-12 PROCEDURE — 83880 ASSAY OF NATRIURETIC PEPTIDE: CPT | Performed by: EMERGENCY MEDICINE

## 2022-01-12 PROCEDURE — 99900035 HC TECH TIME PER 15 MIN (STAT)

## 2022-01-12 PROCEDURE — 81001 URINALYSIS AUTO W/SCOPE: CPT | Performed by: EMERGENCY MEDICINE

## 2022-01-12 PROCEDURE — 63600175 PHARM REV CODE 636 W HCPCS: Performed by: NURSE PRACTITIONER

## 2022-01-12 PROCEDURE — 80053 COMPREHEN METABOLIC PANEL: CPT | Performed by: EMERGENCY MEDICINE

## 2022-01-12 RX ORDER — SODIUM CHLORIDE AND POTASSIUM CHLORIDE 150; 900 MG/100ML; MG/100ML
INJECTION, SOLUTION INTRAVENOUS CONTINUOUS
Status: DISCONTINUED | OUTPATIENT
Start: 2022-01-12 | End: 2022-01-13

## 2022-01-12 RX ORDER — PANTOPRAZOLE SODIUM 40 MG/10ML
40 INJECTION, POWDER, LYOPHILIZED, FOR SOLUTION INTRAVENOUS DAILY
Status: DISCONTINUED | OUTPATIENT
Start: 2022-01-13 | End: 2022-01-15

## 2022-01-12 RX ORDER — ENOXAPARIN SODIUM 100 MG/ML
40 INJECTION SUBCUTANEOUS EVERY 24 HOURS
Status: DISCONTINUED | OUTPATIENT
Start: 2022-01-12 | End: 2022-01-12

## 2022-01-12 RX ORDER — POTASSIUM CHLORIDE 20 MEQ/1
20 TABLET, EXTENDED RELEASE ORAL
Status: DISCONTINUED | OUTPATIENT
Start: 2022-01-12 | End: 2022-01-16 | Stop reason: HOSPADM

## 2022-01-12 RX ORDER — POTASSIUM CHLORIDE 20 MEQ/1
40 TABLET, EXTENDED RELEASE ORAL
Status: DISCONTINUED | OUTPATIENT
Start: 2022-01-12 | End: 2022-01-16 | Stop reason: HOSPADM

## 2022-01-12 RX ORDER — ACETAMINOPHEN 325 MG/1
650 TABLET ORAL EVERY 8 HOURS PRN
Status: DISCONTINUED | OUTPATIENT
Start: 2022-01-12 | End: 2022-01-12

## 2022-01-12 RX ORDER — POTASSIUM CHLORIDE 7.45 MG/ML
20 INJECTION INTRAVENOUS
Status: DISCONTINUED | OUTPATIENT
Start: 2022-01-12 | End: 2022-01-16 | Stop reason: HOSPADM

## 2022-01-12 RX ORDER — GLUCAGON 1 MG
1 KIT INJECTION
Status: DISCONTINUED | OUTPATIENT
Start: 2022-01-12 | End: 2022-01-16 | Stop reason: HOSPADM

## 2022-01-12 RX ORDER — MAGNESIUM SULFATE HEPTAHYDRATE 40 MG/ML
2 INJECTION, SOLUTION INTRAVENOUS
Status: DISCONTINUED | OUTPATIENT
Start: 2022-01-12 | End: 2022-01-16 | Stop reason: HOSPADM

## 2022-01-12 RX ORDER — LORAZEPAM 2 MG/ML
0.5 INJECTION INTRAMUSCULAR EVERY 4 HOURS PRN
Status: DISCONTINUED | OUTPATIENT
Start: 2022-01-12 | End: 2022-01-15

## 2022-01-12 RX ORDER — IBUPROFEN 200 MG
16 TABLET ORAL
Status: DISCONTINUED | OUTPATIENT
Start: 2022-01-12 | End: 2022-01-16 | Stop reason: HOSPADM

## 2022-01-12 RX ORDER — MAGNESIUM SULFATE 1 G/100ML
1 INJECTION INTRAVENOUS ONCE
Status: COMPLETED | OUTPATIENT
Start: 2022-01-12 | End: 2022-01-12

## 2022-01-12 RX ORDER — HYDROQUINONE 40 MG/G
CREAM TOPICAL
COMMUNITY
End: 2022-01-12

## 2022-01-12 RX ORDER — LEVOFLOXACIN 5 MG/ML
750 INJECTION, SOLUTION INTRAVENOUS
Status: DISCONTINUED | OUTPATIENT
Start: 2022-01-14 | End: 2022-01-14

## 2022-01-12 RX ORDER — PANTOPRAZOLE SODIUM 40 MG/1
40 TABLET, DELAYED RELEASE ORAL DAILY
Status: DISCONTINUED | OUTPATIENT
Start: 2022-01-13 | End: 2022-01-12

## 2022-01-12 RX ORDER — POTASSIUM CHLORIDE 7.45 MG/ML
40 INJECTION INTRAVENOUS
Status: DISCONTINUED | OUTPATIENT
Start: 2022-01-12 | End: 2022-01-16 | Stop reason: HOSPADM

## 2022-01-12 RX ORDER — MAGNESIUM SULFATE HEPTAHYDRATE 40 MG/ML
4 INJECTION, SOLUTION INTRAVENOUS
Status: DISCONTINUED | OUTPATIENT
Start: 2022-01-12 | End: 2022-01-16 | Stop reason: HOSPADM

## 2022-01-12 RX ORDER — DIPHENHYDRAMINE HCL 25 MG
25 CAPSULE ORAL EVERY 6 HOURS PRN
Status: ON HOLD | COMMUNITY
End: 2022-01-16 | Stop reason: HOSPADM

## 2022-01-12 RX ORDER — POTASSIUM CHLORIDE 7.45 MG/ML
10 INJECTION INTRAVENOUS ONCE
Status: COMPLETED | OUTPATIENT
Start: 2022-01-12 | End: 2022-01-12

## 2022-01-12 RX ORDER — BISACODYL 10 MG
10 SUPPOSITORY, RECTAL RECTAL DAILY PRN
Status: DISCONTINUED | OUTPATIENT
Start: 2022-01-12 | End: 2022-01-16 | Stop reason: HOSPADM

## 2022-01-12 RX ORDER — SODIUM CHLORIDE 0.9 % (FLUSH) 0.9 %
10 SYRINGE (ML) INJECTION
Status: DISCONTINUED | OUTPATIENT
Start: 2022-01-12 | End: 2022-01-16 | Stop reason: HOSPADM

## 2022-01-12 RX ORDER — LORAZEPAM 0.5 MG/1
0.5 TABLET ORAL EVERY 12 HOURS PRN
Status: DISCONTINUED | OUTPATIENT
Start: 2022-01-12 | End: 2022-01-12

## 2022-01-12 RX ORDER — SODIUM CHLORIDE 9 MG/ML
INJECTION, SOLUTION INTRAVENOUS
Status: DISCONTINUED | OUTPATIENT
Start: 2022-01-12 | End: 2022-01-12

## 2022-01-12 RX ORDER — POTASSIUM CHLORIDE 750 MG/1
10 CAPSULE, EXTENDED RELEASE ORAL ONCE
Status: COMPLETED | OUTPATIENT
Start: 2022-01-12 | End: 2022-01-12

## 2022-01-12 RX ORDER — INSULIN ASPART 100 [IU]/ML
0-5 INJECTION, SOLUTION INTRAVENOUS; SUBCUTANEOUS
Status: DISCONTINUED | OUTPATIENT
Start: 2022-01-12 | End: 2022-01-16 | Stop reason: HOSPADM

## 2022-01-12 RX ORDER — MORPHINE SULFATE 4 MG/ML
4 INJECTION, SOLUTION INTRAMUSCULAR; INTRAVENOUS EVERY 4 HOURS PRN
Status: DISCONTINUED | OUTPATIENT
Start: 2022-01-12 | End: 2022-01-12

## 2022-01-12 RX ORDER — PROCHLORPERAZINE EDISYLATE 5 MG/ML
5 INJECTION INTRAMUSCULAR; INTRAVENOUS EVERY 6 HOURS PRN
Status: DISCONTINUED | OUTPATIENT
Start: 2022-01-12 | End: 2022-01-16 | Stop reason: HOSPADM

## 2022-01-12 RX ORDER — NALOXONE HCL 0.4 MG/ML
0.02 VIAL (ML) INJECTION
Status: DISCONTINUED | OUTPATIENT
Start: 2022-01-12 | End: 2022-01-16 | Stop reason: HOSPADM

## 2022-01-12 RX ORDER — LANOLIN ALCOHOL/MO/W.PET/CERES
800 CREAM (GRAM) TOPICAL
Status: DISCONTINUED | OUTPATIENT
Start: 2022-01-12 | End: 2022-01-16 | Stop reason: HOSPADM

## 2022-01-12 RX ORDER — LETROZOLE 2.5 MG/1
2.5 TABLET, FILM COATED ORAL DAILY
Status: DISCONTINUED | OUTPATIENT
Start: 2022-01-13 | End: 2022-01-12

## 2022-01-12 RX ORDER — LOSARTAN POTASSIUM 25 MG/1
25 TABLET ORAL DAILY
COMMUNITY
Start: 2021-11-09

## 2022-01-12 RX ORDER — IBUPROFEN 200 MG
24 TABLET ORAL
Status: DISCONTINUED | OUTPATIENT
Start: 2022-01-12 | End: 2022-01-16 | Stop reason: HOSPADM

## 2022-01-12 RX ORDER — ONDANSETRON 2 MG/ML
4 INJECTION INTRAMUSCULAR; INTRAVENOUS EVERY 8 HOURS PRN
Status: DISCONTINUED | OUTPATIENT
Start: 2022-01-12 | End: 2022-01-12

## 2022-01-12 RX ORDER — MAGNESIUM SULFATE 1 G/100ML
1 INJECTION INTRAVENOUS
Status: DISCONTINUED | OUTPATIENT
Start: 2022-01-12 | End: 2022-01-16 | Stop reason: HOSPADM

## 2022-01-12 RX ORDER — MORPHINE SULFATE 4 MG/ML
4 INJECTION, SOLUTION INTRAMUSCULAR; INTRAVENOUS EVERY 4 HOURS PRN
Status: DISCONTINUED | OUTPATIENT
Start: 2022-01-12 | End: 2022-01-13

## 2022-01-12 RX ORDER — LEVOFLOXACIN 5 MG/ML
750 INJECTION, SOLUTION INTRAVENOUS
Status: DISCONTINUED | OUTPATIENT
Start: 2022-01-12 | End: 2022-01-12

## 2022-01-12 RX ORDER — ALBUTEROL SULFATE 90 UG/1
2 AEROSOL, METERED RESPIRATORY (INHALATION) EVERY 4 HOURS PRN
Status: DISCONTINUED | OUTPATIENT
Start: 2022-01-12 | End: 2022-01-16 | Stop reason: HOSPADM

## 2022-01-12 RX ORDER — ACETAMINOPHEN 325 MG/1
650 TABLET ORAL EVERY 4 HOURS PRN
Status: DISCONTINUED | OUTPATIENT
Start: 2022-01-12 | End: 2022-01-12

## 2022-01-12 RX ORDER — LEVOFLOXACIN 5 MG/ML
750 INJECTION, SOLUTION INTRAVENOUS
Status: COMPLETED | OUTPATIENT
Start: 2022-01-12 | End: 2022-01-13

## 2022-01-12 RX ORDER — MONTELUKAST SODIUM 10 MG/1
10 TABLET ORAL DAILY
Status: DISCONTINUED | OUTPATIENT
Start: 2022-01-13 | End: 2022-01-12

## 2022-01-12 RX ADMIN — POTASSIUM CHLORIDE 10 MEQ: 7.46 INJECTION, SOLUTION INTRAVENOUS at 09:01

## 2022-01-12 RX ADMIN — FOLIC ACID: 5 INJECTION, SOLUTION INTRAMUSCULAR; INTRAVENOUS; SUBCUTANEOUS at 09:01

## 2022-01-12 RX ADMIN — POTASSIUM CHLORIDE 10 MEQ: 750 CAPSULE, EXTENDED RELEASE ORAL at 09:01

## 2022-01-12 RX ADMIN — SODIUM CHLORIDE AND POTASSIUM CHLORIDE: 9; 1.49 INJECTION, SOLUTION INTRAVENOUS at 11:01

## 2022-01-12 RX ADMIN — SODIUM CHLORIDE 1000 ML: 9 INJECTION, SOLUTION INTRAVENOUS at 07:01

## 2022-01-12 RX ADMIN — SODIUM CHLORIDE, SODIUM LACTATE, POTASSIUM CHLORIDE, AND CALCIUM CHLORIDE 1000 ML: .6; .31; .03; .02 INJECTION, SOLUTION INTRAVENOUS at 10:01

## 2022-01-12 RX ADMIN — MAGNESIUM SULFATE 1 G: 1 INJECTION INTRAVENOUS at 10:01

## 2022-01-12 RX ADMIN — LEVOFLOXACIN 750 MG: 750 INJECTION, SOLUTION INTRAVENOUS at 11:01

## 2022-01-12 RX ADMIN — MORPHINE SULFATE 4 MG: 4 INJECTION, SOLUTION INTRAMUSCULAR; INTRAVENOUS at 11:01

## 2022-01-13 LAB
ALBUMIN SERPL BCP-MCNC: 2.7 G/DL (ref 3.5–5.2)
ALBUMIN SERPL BCP-MCNC: 2.8 G/DL (ref 3.5–5.2)
ALP SERPL-CCNC: 69 U/L (ref 55–135)
ALP SERPL-CCNC: 72 U/L (ref 55–135)
ALT SERPL W/O P-5'-P-CCNC: 23 U/L (ref 10–44)
ALT SERPL W/O P-5'-P-CCNC: 23 U/L (ref 10–44)
ANION GAP SERPL CALC-SCNC: 12 MMOL/L (ref 8–16)
ANION GAP SERPL CALC-SCNC: 12 MMOL/L (ref 8–16)
AST SERPL-CCNC: 46 U/L (ref 10–40)
AST SERPL-CCNC: 54 U/L (ref 10–40)
BILIRUB SERPL-MCNC: 0.8 MG/DL (ref 0.1–1)
BILIRUB SERPL-MCNC: 1 MG/DL (ref 0.1–1)
BUN SERPL-MCNC: 10 MG/DL (ref 8–23)
BUN SERPL-MCNC: 14 MG/DL (ref 8–23)
CA-I BLDV-SCNC: 1.06 MMOL/L (ref 1.06–1.42)
CALCIUM SERPL-MCNC: 7.3 MG/DL (ref 8.7–10.5)
CALCIUM SERPL-MCNC: 7.9 MG/DL (ref 8.7–10.5)
CHLORIDE SERPL-SCNC: 87 MMOL/L (ref 95–110)
CHLORIDE SERPL-SCNC: 91 MMOL/L (ref 95–110)
CHOLEST SERPL-MCNC: 89 MG/DL (ref 120–199)
CHOLEST/HDLC SERPL: 2.6 {RATIO} (ref 2–5)
CO2 SERPL-SCNC: 34 MMOL/L (ref 23–29)
CO2 SERPL-SCNC: 35 MMOL/L (ref 23–29)
CREAT SERPL-MCNC: 1.1 MG/DL (ref 0.5–1.4)
CREAT SERPL-MCNC: 1.2 MG/DL (ref 0.5–1.4)
EST. GFR  (AFRICAN AMERICAN): 56 ML/MIN/1.73 M^2
EST. GFR  (AFRICAN AMERICAN): >60 ML/MIN/1.73 M^2
EST. GFR  (NON AFRICAN AMERICAN): 48.6 ML/MIN/1.73 M^2
EST. GFR  (NON AFRICAN AMERICAN): 53.9 ML/MIN/1.73 M^2
GLUCOSE SERPL-MCNC: 100 MG/DL (ref 70–110)
GLUCOSE SERPL-MCNC: 102 MG/DL (ref 70–110)
GLUCOSE SERPL-MCNC: 130 MG/DL (ref 70–110)
HDLC SERPL-MCNC: 34 MG/DL (ref 40–75)
HDLC SERPL: 38.2 % (ref 20–50)
LACTATE SERPL-SCNC: 0.8 MMOL/L (ref 0.5–1.9)
LACTATE SERPL-SCNC: 1.1 MMOL/L (ref 0.5–1.9)
LACTATE SERPL-SCNC: 1.1 MMOL/L (ref 0.5–1.9)
LACTATE SERPL-SCNC: 1.5 MMOL/L (ref 0.5–1.9)
LACTATE SERPL-SCNC: 2.3 MMOL/L (ref 0.5–1.9)
LDLC SERPL CALC-MCNC: 41 MG/DL (ref 63–159)
MAGNESIUM SERPL-MCNC: 1.8 MG/DL (ref 1.6–2.6)
MAGNESIUM SERPL-MCNC: 2 MG/DL (ref 1.6–2.6)
NONHDLC SERPL-MCNC: 55 MG/DL
PHOSPHATE SERPL-MCNC: 1.5 MG/DL (ref 2.7–4.5)
POTASSIUM SERPL-SCNC: 1.6 MMOL/L (ref 3.5–5.1)
POTASSIUM SERPL-SCNC: 1.7 MMOL/L (ref 3.5–5.1)
POTASSIUM SERPL-SCNC: 1.8 MMOL/L (ref 3.5–5.1)
POTASSIUM SERPL-SCNC: 2 MMOL/L (ref 3.5–5.1)
PROT SERPL-MCNC: 6 G/DL (ref 6–8.4)
PROT SERPL-MCNC: 6.2 G/DL (ref 6–8.4)
SODIUM SERPL-SCNC: 134 MMOL/L (ref 136–145)
SODIUM SERPL-SCNC: 137 MMOL/L (ref 136–145)
TRIGL SERPL-MCNC: 70 MG/DL (ref 30–150)
TROPONIN I SERPL DL<=0.01 NG/ML-MCNC: 0.04 NG/ML
TROPONIN I SERPL DL<=0.01 NG/ML-MCNC: 0.04 NG/ML

## 2022-01-13 PROCEDURE — 25000003 PHARM REV CODE 250: Performed by: NURSE PRACTITIONER

## 2022-01-13 PROCEDURE — 63600175 PHARM REV CODE 636 W HCPCS: Performed by: NURSE PRACTITIONER

## 2022-01-13 PROCEDURE — 83735 ASSAY OF MAGNESIUM: CPT | Mod: 91 | Performed by: FAMILY MEDICINE

## 2022-01-13 PROCEDURE — 83605 ASSAY OF LACTIC ACID: CPT | Mod: 91 | Performed by: NURSE PRACTITIONER

## 2022-01-13 PROCEDURE — 83735 ASSAY OF MAGNESIUM: CPT | Performed by: NURSE PRACTITIONER

## 2022-01-13 PROCEDURE — 80053 COMPREHEN METABOLIC PANEL: CPT | Performed by: NURSE PRACTITIONER

## 2022-01-13 PROCEDURE — 83605 ASSAY OF LACTIC ACID: CPT | Performed by: NURSE PRACTITIONER

## 2022-01-13 PROCEDURE — 80053 COMPREHEN METABOLIC PANEL: CPT | Mod: 91 | Performed by: FAMILY MEDICINE

## 2022-01-13 PROCEDURE — 25000003 PHARM REV CODE 250: Performed by: FAMILY MEDICINE

## 2022-01-13 PROCEDURE — 94761 N-INVAS EAR/PLS OXIMETRY MLT: CPT

## 2022-01-13 PROCEDURE — 82330 ASSAY OF CALCIUM: CPT | Performed by: HOSPITALIST

## 2022-01-13 PROCEDURE — 84100 ASSAY OF PHOSPHORUS: CPT | Performed by: NURSE PRACTITIONER

## 2022-01-13 PROCEDURE — 84484 ASSAY OF TROPONIN QUANT: CPT | Mod: 91 | Performed by: NURSE PRACTITIONER

## 2022-01-13 PROCEDURE — 63600175 PHARM REV CODE 636 W HCPCS: Performed by: FAMILY MEDICINE

## 2022-01-13 PROCEDURE — 63600175 PHARM REV CODE 636 W HCPCS: Performed by: HOSPITALIST

## 2022-01-13 PROCEDURE — 84132 ASSAY OF SERUM POTASSIUM: CPT | Mod: 91 | Performed by: HOSPITALIST

## 2022-01-13 PROCEDURE — 20000000 HC ICU ROOM

## 2022-01-13 PROCEDURE — 25000003 PHARM REV CODE 250: Performed by: HOSPITALIST

## 2022-01-13 PROCEDURE — C9113 INJ PANTOPRAZOLE SODIUM, VIA: HCPCS | Performed by: NURSE PRACTITIONER

## 2022-01-13 PROCEDURE — 99900035 HC TECH TIME PER 15 MIN (STAT)

## 2022-01-13 PROCEDURE — 36415 COLL VENOUS BLD VENIPUNCTURE: CPT | Performed by: FAMILY MEDICINE

## 2022-01-13 PROCEDURE — 80061 LIPID PANEL: CPT | Performed by: HOSPITALIST

## 2022-01-13 RX ORDER — POTASSIUM CHLORIDE 7.45 MG/ML
10 INJECTION INTRAVENOUS
Status: COMPLETED | OUTPATIENT
Start: 2022-01-13 | End: 2022-01-13

## 2022-01-13 RX ORDER — MORPHINE SULFATE 2 MG/ML
2 INJECTION, SOLUTION INTRAMUSCULAR; INTRAVENOUS EVERY 4 HOURS PRN
Status: DISCONTINUED | OUTPATIENT
Start: 2022-01-13 | End: 2022-01-16 | Stop reason: HOSPADM

## 2022-01-13 RX ORDER — SODIUM CHLORIDE 9 MG/ML
INJECTION, SOLUTION INTRAVENOUS CONTINUOUS
Status: DISCONTINUED | OUTPATIENT
Start: 2022-01-13 | End: 2022-01-15

## 2022-01-13 RX ORDER — MAGNESIUM SULFATE HEPTAHYDRATE 40 MG/ML
2 INJECTION, SOLUTION INTRAVENOUS ONCE
Status: COMPLETED | OUTPATIENT
Start: 2022-01-13 | End: 2022-01-13

## 2022-01-13 RX ORDER — LOSARTAN POTASSIUM 25 MG/1
25 TABLET ORAL DAILY
Status: DISCONTINUED | OUTPATIENT
Start: 2022-01-14 | End: 2022-01-16 | Stop reason: HOSPADM

## 2022-01-13 RX ADMIN — POTASSIUM CHLORIDE 40 MEQ: 7.46 INJECTION, SOLUTION INTRAVENOUS at 03:01

## 2022-01-13 RX ADMIN — PANTOPRAZOLE SODIUM 40 MG: 40 INJECTION, POWDER, FOR SOLUTION INTRAVENOUS at 09:01

## 2022-01-13 RX ADMIN — POTASSIUM CHLORIDE 10 MEQ: 7.46 INJECTION, SOLUTION INTRAVENOUS at 03:01

## 2022-01-13 RX ADMIN — POTASSIUM CHLORIDE 10 MEQ: 7.46 INJECTION, SOLUTION INTRAVENOUS at 07:01

## 2022-01-13 RX ADMIN — SODIUM CHLORIDE: 0.9 INJECTION, SOLUTION INTRAVENOUS at 12:01

## 2022-01-13 RX ADMIN — MORPHINE SULFATE 2 MG: 2 INJECTION, SOLUTION INTRAMUSCULAR; INTRAVENOUS at 06:01

## 2022-01-13 RX ADMIN — SODIUM CHLORIDE: 0.9 INJECTION, SOLUTION INTRAVENOUS at 08:01

## 2022-01-13 RX ADMIN — MORPHINE SULFATE 2 MG: 2 INJECTION, SOLUTION INTRAMUSCULAR; INTRAVENOUS at 10:01

## 2022-01-13 RX ADMIN — POTASSIUM CHLORIDE 10 MEQ: 7.46 INJECTION, SOLUTION INTRAVENOUS at 04:01

## 2022-01-13 RX ADMIN — FOLIC ACID 5 MG: 5 INJECTION, SOLUTION INTRAMUSCULAR; INTRAVENOUS; SUBCUTANEOUS at 10:01

## 2022-01-13 RX ADMIN — CALCIUM CHLORIDE 1 G: 100 INJECTION, SOLUTION INTRAVENOUS at 10:01

## 2022-01-13 RX ADMIN — POTASSIUM CHLORIDE 40 MEQ: 7.46 INJECTION, SOLUTION INTRAVENOUS at 10:01

## 2022-01-13 RX ADMIN — THIAMINE HYDROCHLORIDE 300 MG: 100 INJECTION, SOLUTION INTRAMUSCULAR; INTRAVENOUS at 12:01

## 2022-01-13 RX ADMIN — POTASSIUM CHLORIDE 40 MEQ: 20 TABLET, EXTENDED RELEASE ORAL at 10:01

## 2022-01-13 RX ADMIN — SODIUM CHLORIDE: 0.9 INJECTION, SOLUTION INTRAVENOUS at 02:01

## 2022-01-13 RX ADMIN — MAGNESIUM SULFATE 2 G: 2 INJECTION INTRAVENOUS at 10:01

## 2022-01-13 RX ADMIN — POTASSIUM CHLORIDE 10 MEQ: 7.46 INJECTION, SOLUTION INTRAVENOUS at 01:01

## 2022-01-13 RX ADMIN — POTASSIUM CHLORIDE 10 MEQ: 7.46 INJECTION, SOLUTION INTRAVENOUS at 05:01

## 2022-01-13 RX ADMIN — POTASSIUM PHOSPHATE, MONOBASIC AND POTASSIUM PHOSPHATE, DIBASIC 15 MMOL: 224; 236 INJECTION, SOLUTION, CONCENTRATE INTRAVENOUS at 06:01

## 2022-01-13 RX ADMIN — POTASSIUM CHLORIDE 40 MEQ: 7.46 INJECTION, SOLUTION INTRAVENOUS at 07:01

## 2022-01-13 NOTE — H&P
[facility] Medicine History & Physical Examination   Patient Name: Claire Mcnamara  MRN: [id number]  Patient Class: IP- Inpatient   Admission Date: 1/12/2022  7:24 PM  Length of Stay: 0  Attending Physician: Paty Nicholson MD  Primary Care Provider: Luis Manuel Montalvo DO  Face-to-Face encounter date: 01/12/2022  Code Status: Full code    Chief Complaint: sent by md        HISTORY OF PRESENT ILLNESS:   Claire Mcnamara is a 61 y.o.   female with known history of diabetes, hypertension , hyperlipidemia , breast cancer, anxiety/depression  who presented with a primary complaint of sent by md  History was obtained from the patient and collateral obtained from the ER physician Sign-out.     Reports persistent nausea/vomiting, associated with intermittent mild-to-moderate diffuse cramping abdominal pain with no aggravating/alleviating factors for the past 7-8 days.  States that she has been unable to keep any food/fluids down.  States that she has lost about 50 lbs in the past month.  States that she may drink 3 drinks a day but not daily.  States that she drank very heavily on Thanksgiving.  Reports last drank on Christmas.  Denies fever, chills, cough, shortness of breath, chest pain, diarrhea, urinary symptoms.  Reports occasional muscle cramping.    Per ED physician, her  mentioned that she usually drinks daily and stopped drinking about 5 days ago.    Afebrile, tachycardic, hypotensive on arrival - 79/54    CBC - WBC 12.18, hemoglobin 12.4, hematocrit 35.5, platelet 248  CMP - sodium 125, potassium 1.5, chloride 72, CO2 33, anion gap 20, BUN 18, creatinine 1.7, estimated GFR 36.7, AST 68, ALT 29  Magnesium 1.7  BNP -normal, 62  Troponin - minimally elevated 0.043  Lactic acid elevated at 3.2  Lipase elevated, 134  Ethanol - less than 5  Beta hydroxybutyrate within normal limits, 0.2  Thyroid stimulating hormone normal, 1.33  Procalcitonin pending  COVID-19 screen  negative  EKG - sinus tachycardia with premature supraventricular contractions, no ST elevation or depression, QTC prolonged at 526 millisecond  CXR -no obvious infiltrates or overt heart failure  CT abdomen pelvis -   IMPRESSION:  1.  Gallbladder wall thickening and pericholecystic fluid, suspicious for acute cholecystitis. The acute cholecystitis may be secondary to acute pancreatitis.  2.  Acute pancreatitis.  3.  Lobular contours to the left kidney. Although this could be sequelae of scarring, or persistent fetal lobulation, the possibility of renal masses is of greatest rest of the patient. The ultrasound of October 1, 2019. Demonstrate cysts within the inferior pole the right kidney. These other lesions may represent benign cysts as well, however, I cannot correlate the location of the lobular margins on today's CT scan with the location of the cyst from the 2019 ultrasound. Therefore additional follow-up is warranted whether that be an additional renal ultrasound, multiphase CT or multiphase MRI.  REVIEW OF SYSTEMS:   10 Point Review of System was performed and was found to be negative except for that mentioned already in the HPI above.     PAST MEDICAL HISTORY:     Past Medical History:   Diagnosis Date    Anxiety     Asthma     occ problems    Breast cancer     right    Cancer 11/2019    right breast- surg scheduled    Constipation     Depression     Diabetes mellitus, type 2     GERD (gastroesophageal reflux disease)     Hyperlipidemia     Hypertension     x yrs    Prediabetes        PAST SURGICAL HISTORY:     Past Surgical History:   Procedure Laterality Date    BREAST BIOPSY Right 11/2019    BREAST LUMPECTOMY      SENTINEL LYMPH NODE BIOPSY Right 12/26/2019    Procedure: BIOPSY, LYMPH NODE, SENTINEL;  Surgeon: Luis Manuel Rios MD;  Location: [facility];  Service: General;  Laterality: Right;  SENTINEL @ 9A    TUBAL LIGATION  1939    VAGINAL DELIVERY      x 2       ALLERGIES:    Amoxicillin    FAMILY HISTORY:     Family History   Problem Relation Age of Onset    Breast cancer Sister     Breast cancer Maternal Aunt     Breast cancer Other     Pancreatic cancer Mother     Breast cancer Mother     Breast cancer Paternal Aunt        SOCIAL HISTORY:     Social History     Tobacco Use    Smoking status: Current Some Day Smoker     Packs/day: 0.25     Types: Cigarettes    Smokeless tobacco: Never Used    Tobacco comment: trying   Substance Use Topics    Alcohol use: Yes     Comment: Socially         Social History     Substance and Sexual Activity   Sexual Activity Not on file        HOME MEDICATIONS:     Prior to Admission medications    Medication Sig Start Date End Date Taking? Authorizing Provider   albuterol (PROVENTIL/VENTOLIN HFA) 90 mcg/actuation inhaler Inhale 2 puffs into the lungs every 4 (four) hours as needed. 4/1/15  Yes Historical Provider   amLODIPine (NORVASC) 10 MG tablet Take 10 mg by mouth once daily.   Yes Historical Provider   diphenhydrAMINE (BENADRYL) 25 mg capsule Take 25 mg by mouth every 6 (six) hours as needed for Itching.   Yes Historical Provider   FLUoxetine 40 MG capsule Take 1 capsule by mouth once daily.   Yes Historical Provider   hydroCHLOROthiazide (HYDRODIURIL) 25 MG tablet Take 1 tablet by mouth once daily. 9/9/19  Yes Historical Provider   letrozole (FEMARA) 2.5 mg Tab TAKE ONE TABLET BY MOUTH EVERY DAY.  Patient taking differently: Take 2.5 mg by mouth once daily. 6/7/21  Yes Ramo Saenz MD   LORazepam (ATIVAN) 0.5 MG tablet Take 0.5 mg by mouth 2 (two) times daily as needed.  10/23/19  Yes Historical Provider   losartan (COZAAR) 25 MG tablet Take 25 mg by mouth once daily. 11/9/21  Yes Historical Provider   metFORMIN (GLUCOPHAGE) 1000 MG tablet Take 1 tablet by mouth every evening. 9/14/19  Yes Historical Provider   montelukast (SINGULAIR) 10 mg tablet Take 10 mg by mouth once daily.  12/8/19  Yes Historical Provider   omeprazole  "(PRILOSEC) 40 MG capsule Take 1 capsule by mouth before breakfast. 9/14/19  Yes Historical Provider   rosuvastatin (CRESTOR) 10 MG tablet Take 1 tablet by mouth once daily. 9/14/19  Yes Historical Provider   vitamin D (VITAMIN D3) 1000 units Tab Take 1,000 Units by mouth once daily.   Yes Historical Provider   ascorbic acid, vitamin C, (VITAMIN C) 500 MG tablet Take 500 mg by mouth once daily.  1/12/22  Historical Provider   HYDROcodone-acetaminophen (NORCO) 5-325 mg per tablet Take 1 tablet by mouth every 8 (eight) hours as needed for Pain. 4/23/20 1/12/22  Liang Grier Jr., MD   hydroquinone 4 % Crea hydroquinone 4 % topical cream  1/12/22  Historical Provider   silver sulfADIAZINE 1% (SILVADENE) 1 % cream Apply topically 2 (two) times daily. 3/16/20 1/12/22  Liang Grier Jr., MD         PHYSICAL EXAM:   BP 98/63   Pulse 92   Temp 98.5 °F (36.9 °C) (Oral)   Resp (!) 23   Ht 5' 3" (1.6 m)   Wt 83 kg (183 lb)   SpO2 95%   BMI 32.42 kg/m²   Vitals Reviewed  General appearance: Well-developed, nondiaphoretic, nontoxic appearing Black or  female   Skin: No Rash, poor tugor  Neuro: Motor and sensory exams grossly intact. Good tone. Power in all 4 extremities 5/5.   HENT: Atraumatic head.  Oral mucosa dry.  Eyes: Normal extraocular movements.   Neck: Supple. No evidence of lymphadenopathy. No thyroidomegaly.  Lungs: Clear to auscultation bilaterally. No wheezing present.   Heart: Regular rate and rhythm. Tachycardic. S1 and S2 present with no murmurs/gallop/rub. No pedal edema. No JVD present.   Abdomen: Mild diffuse tenderness. No rebound tenderness/guarding.   Extremities: No cyanosis, clubbing.  Psych/mental status: Alert and oriented. Cooperative. Responds appropriately to questions.   EMERGENCY DEPARTMENT LABS AND IMAGING:     Labs Reviewed   CBC W/ AUTO DIFFERENTIAL - Abnormal; Notable for the following components:       Result Value    Hematocrit 35.5 (*)     Immature Granulocytes 0.7 " (*)     Gran # (ANC) 9.4 (*)     Immature Grans (Abs) 0.08 (*)     Gran % 77.0 (*)     Lymph % 14.2 (*)     All other components within normal limits   COMPREHENSIVE METABOLIC PANEL - Abnormal; Notable for the following components:    Sodium 125 (*)     Potassium 1.5 (*)     Chloride 72 (*)     CO2 33 (*)     Creatinine 1.7 (*)     Calcium 7.9 (*)     Total Bilirubin 1.3 (*)     AST 68 (*)     Anion Gap 20 (*)     eGFR if  36.7 (*)     eGFR if non  31.9 (*)     All other components within normal limits    Narrative:      Potassium, Chloride critical result(s) repeated. Called and verbal                   readback obtained from Teresa Mckinnon Rn/ER by AS2 01/12/2022 20:36   LIPASE - Abnormal; Notable for the following components:    Lipase 134 (*)     All other components within normal limits   LACTIC ACID, PLASMA - Abnormal; Notable for the following components:    Lactate (Lactic Acid) 3.2 (*)     All other components within normal limits   TROPONIN I - Abnormal; Notable for the following components:    Troponin I 0.043 (*)     All other components within normal limits   ISTAT PROCEDURE - Abnormal; Notable for the following components:    POC PH 7.454 (*)     POC PCO2 57.7 (*)     POC PO2 23 (*)     POC HCO3 40.4 (*)     POC SATURATED O2 40 (*)     POC TCO2 42 (*)     All other components within normal limits   CULTURE, BLOOD   CULTURE, BLOOD   SARS-COV-2 RNA AMPLIFICATION, QUAL   B-TYPE NATRIURETIC PEPTIDE   MAGNESIUM   PROTIME-INR   TSH   ALCOHOL,MEDICAL (ETHANOL)   BETA - HYDROXYBUTYRATE, SERUM   PROCALCITONIN   URINALYSIS, REFLEX TO URINE CULTURE   DRUG SCREEN PANEL, URINE EMERGENCY   URINALYSIS, REFLEX TO URINE CULTURE   LACTIC ACID, PLASMA       CT Abdomen Pelvis  Without Contrast   Final Result      X-Ray Chest AP Portable    (Results Pending)   US Abdomen Limited (Gallbladder)    (Results Pending)   US Kidney    (Results Pending)       ASSESSMENT & PLAN:   Claire Mcnamara  is a 61 y.o. female admitted for    Active problems:  Acute pancreatitis  Acute cholecystitis  Persistent nausea and vomiting due to above  Hypokalemia, severe  Hyponatremia  Acute kidney injury  Lactic acidosis  Elevated troponin  Tobacco use  ETOH use  Lobular contours to the left kidney    Chronic problems:  Hypertension  Hyperlipidemia  Anxiety/depression    Plan  Admit, inpatient, ICU  Receiving a total of 3 L IV bolus (including a banana bag) in the ED  Then NSS + 20 mEQ at 130 ml/hr  KCL 10 mEQ IV in progress in the ED, 10 mEq PO given in the ED  1 gram Magnesium IV to be given in the ED  Trend trop[onin  Trend lactic acid  Daily CBC, CMP, magnesium, phosphorus  Hold p.o. medication including amlodipine, fluoxetine, Hydrea uro, metformin, Cozaar  Advised smoking cessation  Renal ultrasound to follow-up abnormal lobular contour to the left kidney  IV levaquin started in the ED. Allergic to PCN. Continue daily.  Consult surgery.  The ED physician had discussed case with Dr. Cortez.  Consult GI.  The ED physician had discussed case with Dr. Giles. May need ERCP.  Monitor for ETOH withdrawal, CIWA scale q 4 h  Ativan 0.5 mg IV q 4 h PRN for CIWA score > 10  Notify MD for CIWA score > 15  Thiamine 300 mg IV daily x 3 days  Folic acid 5 mg IV daily x 3 days      Diet: NPO    DVT Prophylaxis: SCD for DVT prophylaxis. Encourage ambulation and OOB as tolerated.     Discharge Planning and Disposition:  Patient will be discharged in 2-4 days.  ________________________________________________________________________________    This patient is high risk for life-threatening deterioration and death secondary to above comorbidities and need for IV treatment. This patient meets inpatient criteria.   Face-to-Face encounter date: 01/12/2022  Encounter included review of the medical records, interviewing and examining the patient face-to-face, discussion with family and other health care providers including emergency medicine  physician, admission orders, interpreting lab/test results and formulating a plan of care.   Medical Decision Making during this encounter was  [_] Low Complexity  [_] Moderate Complexity  [x] High Complexity  _________________________________________________________________________________    INPATIENT LIST OF MEDICATIONS     Current Facility-Administered Medications:     0.9 % NaCl with KCl 20 mEq infusion, , Intravenous, Continuous, [place] P. Pola, APRN    albuterol inhaler 2 puff, 2 puff, Inhalation, Q4H PRN, [place] P. Pola, APRN    bisacodyL suppository 10 mg, 10 mg, Rectal, Daily PRN, N[place]e P. Pola, APRN    calcium chloride 1 g in dextrose 5 % 100 mL IVPB, 1 g, Intravenous, PRN, N[place]e P. Pola, APRN    calcium chloride 1 g in dextrose 5 % 100 mL IVPB, 1 g, Intravenous, PRN, N[place]e P. Pola, APRN    calcium chloride 1 g in dextrose 5 % 100 mL IVPB, 1 g, Intravenous, PRN, N[place]e P. Pola, APRN    dextrose 50% injection 12.5 g, 12.5 g, Intravenous, PRN, N[place]e P. Pola, APRN    dextrose 50% injection 25 g, 25 g, Intravenous, PRN, [place]e P. Pola, APRN    [START ON 1/13/2022] folic acid 5 mg in dextrose 5 % 250 mL IVPB, 5 mg, Intravenous, Daily, N[place]e P. Pola, APRN    glucagon (human recombinant) injection 1 mg, 1 mg, Intramuscular, PRN, [place]e P. Pola, APRN    glucose chewable tablet 16 g, 16 g, Oral, PRN, Nant[place]e P. Pola, APRN    glucose chewable tablet 24 g, 24 g, Oral, PRN, Nant[place]adee P. Pola, APRN    insulin aspart U-100 pen 0-5 Units, 0-5 Units, Subcutaneous, QID (AC + HS) PRN, Anatoly Escalona, LISA    lactated ringers bolus 1,000 mL, 1,000 mL, Intravenous, Once, Anand Tam MD, Last Rate: 999 mL/hr at 01/12/22 2229, 1,000 mL at 01/12/22 2229    levoFLOXacin 750 mg/150 mL IVPB 750 mg, 750 mg, Intravenous, ED 1 Time, Anand Tam MD    levoFLOXacin 750 mg/150 mL IVPB 750 mg, 750 mg, Intravenous, Q24H, Anatoly Escalona, LISA    lidocaine (PF) 10 mg/ml (1%)  injection 10 mg, 1 mL, Intradermal, Once, Luis Manuel Rios MD    lorazepam injection 0.5 mg, 0.5 mg, Intravenous, Q4H PRN, Nant[place]adee P. Pola, APRN    magnesium oxide tablet 800 mg, 800 mg, Oral, PRN, Nant[place]adee P. Pola, APRN    magnesium sulfate 2g in water 50mL IVPB (premix), 2 g, Intravenous, PRN, Nant[place]adee P. Pola, APRN    magnesium sulfate 2g in water 50mL IVPB (premix), 4 g, Intravenous, PRN, Nant[place]adee P. Pola, APRN    magnesium sulfate 2g in water 50mL IVPB (premix), 2 g, Intravenous, PRN, Nant[place]e P. Pola, APRN    magnesium sulfate in dextrose IVPB (premix) 1 g, 1 g, Intravenous, Once, Anand Tam MD    magnesium sulfate in dextrose IVPB (premix) 1 g, 1 g, Intravenous, PRN, Nant[place]adee P. Pola, APRN    morphine injection 4 mg, 4 mg, Intravenous, Q4H PRN, Nant[place]adee P. Pola, APRN    naloxone 0.4 mg/mL injection 0.02 mg, 0.02 mg, Intravenous, PRN, Nantawadee P. Pola, APRN    [START ON 1/13/2022] pantoprazole injection 40 mg, 40 mg, Intravenous, Daily, [place] P. Pola, APRN    potassium chloride 10 mEq in 100 mL IVPB, 20 mEq, Intravenous, PRN, Nantawadee P. Pola, APRN    potassium chloride 10 mEq in 100 mL IVPB, 40 mEq, Intravenous, PRN, Nantawadee P. Pola, APRN    potassium chloride 10 mEq in 100 mL IVPB, 20 mEq, Intravenous, PRN, Nantawadee P. Pola, APRN    potassium chloride 10 mEq in 100 mL IVPB, 40 mEq, Intravenous, PRN, Nantawadee P. Pola, APRN    potassium chloride SA CR tablet 20 mEq, 20 mEq, Oral, PRN, Nantawadee P. Pola, APRN    potassium chloride SA CR tablet 20 mEq, 20 mEq, Oral, PRN, Nantawadee P. Pola, APRN    potassium chloride SA CR tablet 40 mEq, 40 mEq, Oral, PRN, Anatoly Escalona, APRN    potassium chloride SA CR tablet 40 mEq, 40 mEq, Oral, PRN, Anatoly Escalona, APRN    prochlorperazine injection Soln 5 mg, 5 mg, Intravenous, Q6H PRN, Anatoly Escalona, APRN    sodium chloride 0.9% flush 10 mL, 10 mL, Intravenous, PRN, Anatoly Escalona, APRN    [START ON 1/13/2022]  thiamine (B-1) 300 mg in dextrose 5 % 50 mL IVPB, 300 mg, Intravenous, Daily, LISA Freeman    Current Outpatient Medications:     albuterol (PROVENTIL/VENTOLIN HFA) 90 mcg/actuation inhaler, Inhale 2 puffs into the lungs every 4 (four) hours as needed., Disp: , Rfl:     amLODIPine (NORVASC) 10 MG tablet, Take 10 mg by mouth once daily., Disp: , Rfl:     diphenhydrAMINE (BENADRYL) 25 mg capsule, Take 25 mg by mouth every 6 (six) hours as needed for Itching., Disp: , Rfl:     FLUoxetine 40 MG capsule, Take 1 capsule by mouth once daily., Disp: , Rfl:     hydroCHLOROthiazide (HYDRODIURIL) 25 MG tablet, Take 1 tablet by mouth once daily., Disp: , Rfl: 3    letrozole (FEMARA) 2.5 mg Tab, TAKE ONE TABLET BY MOUTH EVERY DAY. (Patient taking differently: Take 2.5 mg by mouth once daily.), Disp: 30 tablet, Rfl: 5    LORazepam (ATIVAN) 0.5 MG tablet, Take 0.5 mg by mouth 2 (two) times daily as needed. , Disp: , Rfl: 3    losartan (COZAAR) 25 MG tablet, Take 25 mg by mouth once daily., Disp: , Rfl:     metFORMIN (GLUCOPHAGE) 1000 MG tablet, Take 1 tablet by mouth every evening., Disp: , Rfl: 3    montelukast (SINGULAIR) 10 mg tablet, Take 10 mg by mouth once daily. , Disp: , Rfl:     omeprazole (PRILOSEC) 40 MG capsule, Take 1 capsule by mouth before breakfast., Disp: , Rfl: 3    rosuvastatin (CRESTOR) 10 MG tablet, Take 1 tablet by mouth once daily., Disp: , Rfl: 3    vitamin D (VITAMIN D3) 1000 units Tab, Take 1,000 Units by mouth once daily., Disp: , Rfl:       Scheduled Meds:   [START ON 1/13/2022] folic acid (FOLVITE) IVPB  5 mg Intravenous Daily    lactated ringers  1,000 mL Intravenous Once    levoFLOXacin  750 mg Intravenous ED 1 Time    levoFLOXacin  750 mg Intravenous Q24H    LIDOcaine (PF) 10 mg/ml (1%)  1 mL Intradermal Once    magnesium sulfate IVPB  1 g Intravenous Once    [START ON 1/13/2022] pantoprazole  40 mg Intravenous Daily    [START ON 1/13/2022] thiamine (VITAMIN B1) IVPB   300 mg Intravenous Daily     Continuous Infusions:   0/9% NACL & POTASSIUM CHLORIDE 20 MEQ/L       PRN Meds:.albuterol, bisacodyL, calcium chloride IVPB, calcium chloride IVPB, calcium chloride IVPB, dextrose 50%, dextrose 50%, glucagon (human recombinant), glucose, glucose, insulin aspart U-100, lorazepam, magnesium oxide, magnesium sulfate IVPB, magnesium sulfate IVPB, magnesium sulfate IVPB, magnesium sulfate IVPB, morphine, naloxone, potassium chloride in water, potassium chloride in water, potassium chloride in water, potassium chloride in water, potassium chloride, potassium chloride, potassium chloride, potassium chloride, prochlorperazine, sodium chloride 0.9%      Anatoly Escalona  Acute Care Nurse Practitioner  Hospitalist Service  01/12/2022

## 2022-01-13 NOTE — ED NOTES
I spoke with Miss Escalona NP about patient's K+ being 1.6 and she is on her fourth K rider. She said to continue giving her K rider's as ordered.

## 2022-01-13 NOTE — PROGRESS NOTES
[facility] Medicine  Progress Note    Patient Name: Claire Mcnamara  MRN: [id number]  Patient Class: IP- Inpatient   Admission Date: 1/12/2022  Length of Stay: 1 days  Attending Physician: Imtiaz Peacock DO  Primary Care Provider: Luis Manuel Montalvo DO        Subjective:     Principal Problem:<principal problem not specified>        HPI:  No notes on file    Overview/Hospital Course:  1/13 no acute events overnight  Patient remains in ICU replacing potassium and magnesium.  Patient seen by General surgery and GI        Interval History:  No acute events overnight  Patient seen by GI and surgery  Aggressively replacing potassium and magnesium  Patient remains in ICU  Objective:     Vital Signs (Most Recent):  Temp: 98 °F (36.7 °C) (01/13/22 1501)  Pulse: 93 (01/13/22 1630)  Resp: (!) 25 (01/13/22 1630)  BP: (!) 120/93 (01/13/22 1630)  SpO2: (!) 94 % (01/13/22 1630) Vital Signs (24h Range):  Temp:  [98 °F (36.7 °C)-98.5 °F (36.9 °C)] 98 °F (36.7 °C)  Pulse:  [] 93  Resp:  [10-39] 25  SpO2:  [91 %-100 %] 94 %  BP: ()/(53-93) 120/93     Weight: 83 kg (183 lb)  Body mass index is 32.42 kg/m².    Intake/Output Summary (Last 24 hours) at 1/13/2022 1707  Last data filed at 1/13/2022 1706  Gross per 24 hour   Intake 3266.67 ml   Output 1275 ml   Net 1991.67 ml      Physical Exam  Patient appears in no distress lying in bed  Nurse at bedside  HEENT sclerae nonicteric  Neck is supple nontender  Lungs clear to auscultation symmetrical expansion no wheeze  Heart regular rate rhythm  Abdomen is moderately tender diffusely no rebound +BS  Extremities no edema no deformities  Neuro alert oriented x3 motor exam is nonfocal  Psych patient is answering questions appropriately  Cooperative  Significant Labs:   All pertinent labs within the past 24 hours have been reviewed.  BMP:   Recent Labs   Lab 01/13/22  1508         K 2.0*  2.0*   CL 91*   CO2 34*   BUN 10   CREATININE 1.1   CALCIUM  7.9*   MG 2.0     CBC:   Recent Labs   Lab 01/12/22 1953   WBC 12.18   HGB 12.4   HCT 35.5*        CMP:   Recent Labs   Lab 01/12/22 1954 01/12/22 1954 01/13/22  0450 01/13/22  0450 01/13/22  0827 01/13/22  1213 01/13/22  1508   *  --  134*  --   --   --  137   K 1.5*   < > 1.6*   < > 1.8* 1.7* 2.0*  2.0*   CL 72*  --  87*  --   --   --  91*   CO2 33*  --  35*  --   --   --  34*     --  102  --   --   --  100   BUN 18  --  14  --   --   --  10   CREATININE 1.7*  --  1.2  --   --   --  1.1   CALCIUM 7.9*  --  7.3*  --   --   --  7.9*   PROT 7.8  --  6.0  --   --   --  6.2   ALBUMIN 3.6  --  2.7*  --   --   --  2.8*   BILITOT 1.3*  --  1.0  --   --   --  0.8   ALKPHOS 91  --  72  --   --   --  69   AST 68*  --  46*  --   --   --  54*   ALT 29  --  23  --   --   --  23   ANIONGAP 20*  --  12  --   --   --  12   EGFRNONAA 31.9*  --  48.6*  --   --   --  53.9*    < > = values in this interval not displayed.       Significant Imaging:       Assessment/Plan:    #1 EtOH induced pancreatitis-  GI following, continue IV fluids.  Clears as tolerated  Check triglycerides avoid alcohol,  #2 Severe hypokalemia-replacing.  Serial labs  Continue ICU monitoring  #3 Hypo magnesium-replacing  #4 EtOH use disorder ?  Dependence-monitoring  #5 Severe depression-consult Psychiatry      Continue IV fluids, ICU monitoring  Replacing lytes  Clear liquids.  Monitor for alcohol withdrawal      VTE Risk Mitigation (From admission, onward)         Ordered     Place sequential compression device  Until discontinued         01/12/22 2203     IP VTE HIGH RISK PATIENT  Once         01/12/22 2109                Discharge Planning   YOVANNY: 1/17/2022     Code Status: Full Code   Is the patient medically ready for discharge?:     Reason for patient still in hospital (select all that apply): Patient trending condition                     Imtiaz Peacock DO  Department of Hospital Medicine   Formerly [facility], later [facility]

## 2022-01-13 NOTE — ED PROVIDER NOTES
Encounter Date: 1/12/2022       History     Chief Complaint   Patient presents with    sent by md     61-year-old female Presents complaining of nausea vomiting and abdominal discomfort patient has not been able to keep anything down for the past 5 days.  Patient has a history of breast cancer, diabetes, hypertension and hyperlipidemia patient sent from PCP for evaluation of nausea and vomiting.  Patient reports that she is a daily drinker but stopped drinking 5 days ago.  Patient denies tremulousness of her hands weakness patient denies cough shortness of breath or any other acute complaints at this time.        Review of patient's allergies indicates:   Allergen Reactions    Amoxicillin Nausea Only and Other (See Comments)     Past Medical History:   Diagnosis Date    Anxiety     Asthma     occ problems    Breast cancer     right    Cancer 11/2019    right breast- surg scheduled    Constipation     Depression     Diabetes mellitus, type 2     GERD (gastroesophageal reflux disease)     Hyperlipidemia     Hypertension     x yrs    Prediabetes      Past Surgical History:   Procedure Laterality Date    BREAST BIOPSY Right 11/2019    BREAST LUMPECTOMY      SENTINEL LYMPH NODE BIOPSY Right 12/26/2019    Procedure: BIOPSY, LYMPH NODE, SENTINEL;  Surgeon: Luis Manuel Rios MD;  Location: [facility];  Service: General;  Laterality: Right;  SENTINEL @ 9A    TUBAL LIGATION  1938    VAGINAL DELIVERY      x 2     Family History   Problem Relation Age of Onset    Breast cancer Sister     Breast cancer Maternal Aunt     Breast cancer Other     Pancreatic cancer Mother     Breast cancer Mother     Breast cancer Paternal Aunt      Social History     Tobacco Use    Smoking status: Current Some Day Smoker     Packs/day: 0.25     Types: Cigarettes    Smokeless tobacco: Never Used    Tobacco comment: trying   Substance Use Topics    Alcohol use: Yes     Comment: Socially     Drug use: Yes     Types: Marijuana      Comment: occ     Review of Systems   Constitutional: Negative for fever.   HENT: Negative for congestion, rhinorrhea, sore throat and trouble swallowing.    Eyes: Negative for visual disturbance.   Respiratory: Negative for cough, chest tightness, shortness of breath and wheezing.    Cardiovascular: Negative for chest pain, palpitations and leg swelling.   Gastrointestinal: Positive for abdominal pain, nausea and vomiting. Negative for abdominal distention, constipation and diarrhea.   Genitourinary: Negative for difficulty urinating, dysuria, flank pain and frequency.   Musculoskeletal: Negative for arthralgias, back pain, joint swelling and neck pain.   Skin: Negative for color change and rash.   Neurological: Negative for dizziness, syncope, speech difficulty, weakness, numbness and headaches.   All other systems reviewed and are negative.      Physical Exam     Initial Vitals [01/12/22 1920]   BP Pulse Resp Temp SpO2   (!) 79/54 (!) 116 18 98.5 °F (36.9 °C) 99 %      MAP       --         Physical Exam    Nursing note and vitals reviewed.  Constitutional: She appears well-developed and well-nourished. She is not diaphoretic. No distress.   HENT:   Head: Normocephalic and atraumatic.   Right Ear: External ear normal.   Left Ear: External ear normal.   Nose: Nose normal.   Mouth/Throat: Oropharynx is clear and moist. No oropharyngeal exudate.   Eyes: Conjunctivae and EOM are normal. Pupils are equal, round, and reactive to light. Right eye exhibits no discharge. Left eye exhibits no discharge. No scleral icterus.   Neck: Neck supple. No thyromegaly present. No tracheal deviation present. No JVD present.   Normal range of motion.  Cardiovascular: Normal rate, regular rhythm, normal heart sounds and intact distal pulses. Exam reveals no gallop and no friction rub.    No murmur heard.  Pulmonary/Chest: Breath sounds normal. No stridor. No respiratory distress. She has no wheezes. She has no rhonchi. She has no  rales. She exhibits no tenderness.   Abdominal: Abdomen is soft. Bowel sounds are normal. She exhibits no distension and no mass. There is abdominal tenderness.   mild tenderness to palpation in the epigastric region no rebound or guarding There is no rebound and no guarding.   Musculoskeletal:         General: No tenderness or edema. Normal range of motion.      Cervical back: Normal range of motion and neck supple.     Lymphadenopathy:     She has no cervical adenopathy.   Neurological: She is alert and oriented to person, place, and time. She has normal strength. No cranial nerve deficit or sensory deficit.   Skin: Skin is warm and dry. No rash and no abscess noted. No erythema. No pallor.         ED Course   Procedures  Labs Reviewed   CBC W/ AUTO DIFFERENTIAL - Abnormal; Notable for the following components:       Result Value    Hematocrit 35.5 (*)     Immature Granulocytes 0.7 (*)     Gran # (ANC) 9.4 (*)     Immature Grans (Abs) 0.08 (*)     Gran % 77.0 (*)     Lymph % 14.2 (*)     All other components within normal limits   COMPREHENSIVE METABOLIC PANEL - Abnormal; Notable for the following components:    Sodium 125 (*)     Potassium 1.5 (*)     Chloride 72 (*)     CO2 33 (*)     Creatinine 1.7 (*)     Calcium 7.9 (*)     Total Bilirubin 1.3 (*)     AST 68 (*)     Anion Gap 20 (*)     eGFR if  36.7 (*)     eGFR if non  31.9 (*)     All other components within normal limits    Narrative:      Potassium, Chloride critical result(s) repeated. Called and verbal   readback obtained from Teresa Mckinnon Rn/ER by FRANKIE 01/12/2022 20:36   LIPASE - Abnormal; Notable for the following components:    Lipase 134 (*)     All other components within normal limits   LACTIC ACID, PLASMA - Abnormal; Notable for the following components:    Lactate (Lactic Acid) 3.2 (*)     All other components within normal limits   TROPONIN I - Abnormal; Notable for the following components:    Troponin I  0.043 (*)     All other components within normal limits   ISTAT PROCEDURE - Abnormal; Notable for the following components:    POC PH 7.454 (*)     POC PCO2 57.7 (*)     POC PO2 23 (*)     POC HCO3 40.4 (*)     POC SATURATED O2 40 (*)     POC TCO2 42 (*)     All other components within normal limits   CULTURE, BLOOD   CULTURE, BLOOD   SARS-COV-2 RNA AMPLIFICATION, QUAL   B-TYPE NATRIURETIC PEPTIDE   MAGNESIUM   PROTIME-INR   TSH   ALCOHOL,MEDICAL (ETHANOL)   BETA - HYDROXYBUTYRATE, SERUM   PROCALCITONIN   URINALYSIS, REFLEX TO URINE CULTURE   DRUG SCREEN PANEL, URINE EMERGENCY   URINALYSIS, REFLEX TO URINE CULTURE   LACTIC ACID, PLASMA          Imaging Results          US Abdomen Limited (Gallbladder) (In process)                CT Abdomen Pelvis  Without Contrast (Final result)  Result time 01/12/22 21:41:15   Procedure changed from CT Abdomen Pelvis With Contrast     Final result by Alejandro Jerez MD (01/12/22 21:41:15)                 Narrative:    EXAMINATION: CT ABDOMEN PELVIS WITHOUT IV CONTRAST    INDICATION: Female, 61 years old, Epigastric pain    COMPARISON(S): Ultrasound from October 1, 2019    TECHNIQUE: CT acquisition of the abdomen and pelvis following the administration of IV contrast. Coronal and sagittal reformatted images provided. This exam was performed according to departmental dose-optimization program which includes automated exposure control, adjustment of the mA and/or kV according to patient size, and/or use of iterative reconstruction technique.    CONTRAST: None    FINDINGS:  SUPPORTIVE DEVICES: None.  LOWER CHEST: Unremarkable.    ABDOMEN AND PELVIS:  MUSCULOSKELETAL: Degenerative disc disease with degenerative malalignment.  Facet arthropathy.  Disease is most severe at the L4-L5 and L5-S1 levels.  Gallbladder and biliary tree: Gallbladder wall thickening and pericholecystic fluid.  No calcified stones No intra- or extrahepatic biliary ductal dilation.  Liver: Normal.  Pancreas: There  appears to be some indurated fat about the tail of the pancreas  Spleen: Normal.  Adrenal glands: Normal.  Kidneys and ureters: There is some lobular contours to the right kidney.  These are not definitively left dense that of the adjacent parenchyma.  There is some induration of the fat adjacent to the anterior superior aspect of the left kidney that I believe is related to pancreatitis rather than pyelonephritis.  It is feasible that there is secondary pyelonephritis to the left kidney due to the adjacent pancreatitis.    Bladder: The bladder is adequately distended and appears normal.  Vasculature: Athereosclerotic plaque is present within the abdominal aorta w/o aneurysm.  Reproductive organs: The uterus and ovaries are not enlarged.  GI tract: Diverticulosis is severe within the descending and sigmoid colon.  Indurated fat is adjacent to the splenic flexure of colon which is near the tail of the pancreas and likely secondary to pancreatitis rather than colitis  The appendix is normal.   Series 3 image 136    Lymph nodes: No evident adenopathy.  Peritoneum: No ascites or free air. No other fluid collection.  Abdominal wall: Small periumbilical hernia contains only fat      IMPRESSION:  1.  Gallbladder wall thickening and pericholecystic fluid, suspicious for acute cholecystitis. The acute cholecystitis may be secondary to acute pancreatitis.  2.  Acute pancreatitis.    3.  Lobular contours to the left kidney. Although this could be sequelae of scarring, or persistent fetal lobulation, the possibility of renal masses is of greatest rest of the patient. The ultrasound of October 1, 2019. Demonstrate cysts within the inferior pole the right kidney. These other lesions may represent benign cysts as well, however, I cannot correlate the location of the lobular margins on today's CT scan with the location of the cyst from the 2019 ultrasound. Therefore additional follow-up is warranted whether that be an additional  renal ultrasound, multiphase CT or multiphase MRI.    Electronically signed by:  Alejandro Jerez MD  1/12/2022 9:41 PM CST Workstation: [id number]                             X-Ray Chest AP Portable (In process)                  Medications   magnesium sulfate in dextrose IVPB (premix) 1 g (has no administration in time range)   lactated ringers bolus 1,000 mL (has no administration in time range)   potassium chloride SA CR tablet 20 mEq (has no administration in time range)   potassium chloride SA CR tablet 40 mEq (has no administration in time range)   potassium chloride SA CR tablet 20 mEq (has no administration in time range)   potassium chloride SA CR tablet 40 mEq (has no administration in time range)   potassium chloride 10 mEq in 100 mL IVPB (has no administration in time range)   potassium chloride 10 mEq in 100 mL IVPB (has no administration in time range)   potassium chloride 10 mEq in 100 mL IVPB (has no administration in time range)   potassium chloride 10 mEq in 100 mL IVPB (has no administration in time range)   magnesium oxide tablet 800 mg (has no administration in time range)   magnesium sulfate 2g in water 50mL IVPB (premix) (has no administration in time range)   magnesium sulfate in dextrose IVPB (premix) 1 g (has no administration in time range)   magnesium sulfate 2g in water 50mL IVPB (premix) (has no administration in time range)   magnesium sulfate 2g in water 50mL IVPB (premix) (has no administration in time range)   calcium chloride 1 g in dextrose 5 % 100 mL IVPB (has no administration in time range)   calcium chloride 1 g in dextrose 5 % 100 mL IVPB (has no administration in time range)   calcium chloride 1 g in dextrose 5 % 100 mL IVPB (has no administration in time range)   sodium chloride 0.9% flush 10 mL (has no administration in time range)   naloxone 0.4 mg/mL injection 0.02 mg (has no administration in time range)   bisacodyL suppository 10 mg (has no administration in time range)    prochlorperazine injection Soln 5 mg (has no administration in time range)   albuterol inhaler 2 puff (has no administration in time range)   glucose chewable tablet 16 g (has no administration in time range)   glucose chewable tablet 24 g (has no administration in time range)   dextrose 50% injection 12.5 g (has no administration in time range)   dextrose 50% injection 25 g (has no administration in time range)   glucagon (human recombinant) injection 1 mg (has no administration in time range)   insulin aspart U-100 pen 0-5 Units (has no administration in time range)   0.9 % NaCl with KCl 20 mEq infusion (has no administration in time range)   levoFLOXacin 750 mg/150 mL IVPB 750 mg (has no administration in time range)   pantoprazole injection 40 mg (has no administration in time range)   morphine injection 4 mg (has no administration in time range)   lorazepam injection 0.5 mg (has no administration in time range)   thiamine (B-1) 300 mg in dextrose 5 % 50 mL IVPB (has no administration in time range)   folic acid 5 mg in dextrose 5 % 250 mL IVPB (has no administration in time range)   sodium chloride 0.9% bolus 1,000 mL (0 mLs Intravenous Stopped 1/12/22 2218)   sodium chloride 0.9% 1,000 mL with mvi, adult no.4 with vit K 3,300 unit- 150 mcg/10 mL 10 mL, thiamine 100 mg, folic acid 1 mg infusion ( Intravenous New Bag 1/12/22 2112)   potassium chloride 10 mEq in 100 mL IVPB (10 mEq Intravenous New Bag 1/12/22 2110)   potassium chloride CR capsule 10 mEq (10 mEq Oral Given 1/12/22 2110)     Medical Decision Making:   History:   Old Medical Records: I decided to obtain old medical records.  Initial Assessment:   Emergent evaluation of a of a 71-year-old female presenting with abdominal discomfort and nausea and vomiting differential diagnosis includes dehydration, electrolyte abnormality, endocrine dysfunction, infection.            Attending Attestation:         Attending Critical Care:   Critical Care Times:    Direct Patient Care (initial evaluation, reassessments, and time considering the case)................................................................20 minutes.   Additional History from reviewing old medical records or taking additional history from the family, EMS, PCP, etc.......................5 minutes.   Ordering, Reviewing, and Interpreting Diagnostic Studies...............................................................................................................5 minutes.   Documentation..................................................................................................................................................................................10 minutes.   Consultation with other Physicians. .................................................................................................................................................10 minutes.   Consultation with the patient's family directly relating to the patient's condition, care, and DNR status (when patient unable)......5 minutes.   ==============================================================  · Total Critical Care Time - exclusive of procedural time: 55 minutes.  ==============================================================  Critical care was necessary to treat or prevent imminent or life-threatening deterioration of the following conditions: metabolic crisis.   Critical care was time spent personally by me on the following activities: obtaining history from patient or relative, examination of patient, review of old charts, ordering lab, x-rays, and/or EKG, development of treatment plan with patient or relative, ordering and performing treatments and interventions, evaluation of patient's response to treatment, discussion with consultants and re-evaluation of patient's conition.   Critical Care Condition: life-threatening       Attending ED Notes:   Patient found to have multiple electrolyte abnormalities patient also  noted to have inflammation of pancreas and gallbladder patient consulted to Gastroenterology and General surgery who will follow the patient patient admitted to Internal Medicine in the ICU patient's potassium and magnesium supplemented patient given IV fluids patient started on broad-spectrum antibiotics.               Clinical Impression:   Final diagnoses:  [R10.9] Abdominal pain  [K81.9] Cholecystitis          ED Disposition Condition    Admit               Anand Tam MD  01/12/22 [id number]

## 2022-01-13 NOTE — ED TRIAGE NOTES
Patient presents to the ER from home for abnormal labs. She reports weakness, decreased oral intake and nausea x weeks. Unknown abnormal labs. She denies chest pain, SOB or any other symptom. She is awake, alert and oriented but very weak while ambulating.

## 2022-01-13 NOTE — PROGRESS NOTES
Pharmacist Renal Dose Adjustment Note    Claire Mcnamara is a 61 y.o. female being treated with the medication Levofloxacin    Patient Data:    Vital Signs (Most Recent):  Temp: 98.5 °F (36.9 °C) (01/12/22 1920)  Pulse: 83 (01/12/22 2301)  Resp: 18 (01/12/22 2308)  BP: 111/65 (01/12/22 2245)  SpO2: 95 % (01/12/22 2301) Vital Signs (72h Range):  Temp:  [98.5 °F (36.9 °C)]   Pulse:  []   Resp:  [12-23]   BP: ()/(54-67)   SpO2:  [95 %-100 %]      Recent Labs   Lab 01/12/22 1954   CREATININE 1.7*     Serum creatinine: 1.7 mg/dL (H) 01/12/22 1954  Estimated creatinine clearance: 35 mL/min (A)    Medication:Levofloxacin dose: 750 mg frequency Q24H will be changed to medication:Levofloxacin dose:750 mg frequency:Q48H    Pharmacist's Name: Rick Sevilla  Pharmacist's Extension: [phone]

## 2022-01-13 NOTE — CONSULTS
[facility] Emergency Dept  General Surgery  Consult Note    Patient Name: Claire Mcnamara  MRN: [id number]  Code Status: Full Code  Admission Date: 1/12/2022  Hospital Length of Stay: 1 days  Attending Physician: Imtiaz Peacock DO  Primary Care Provider: Luis Manuel Montalvo DO    Patient information was obtained from ER records.     Inpatient consult to General surgery  Consult performed by: Florentin Deras MD  Consult ordered by: Anand Tam MD      Consult received overnight at 10 PM.  Pt with questionable biliary pancreatitis vs Etoh pancreatitis.    Pt d/w surgeon on call today, Dr Phillips, who will see and manage pt.   Thank you for your consult.   Florentin Deras MD  General Surgery  [facility] - Emergency Dept

## 2022-01-13 NOTE — CONSULTS
GASTROENTEROLOGY INPATIENT CONSULT NOTE  Patient Name: Claire Mcnamara  Patient MRN: [id number]  Patient : 1960    Admit Date: 2022  Service date: 2022    Reason for Consult: abdominal pain    PCP: Luis Manuel Montalvo DO    Chief Complaint   Patient presents with    sent by md       HPI: Patient is a 61 y.o. female  with  history of DM, HTN, breast cancer admitted with moderate persistent epigastric sharp crampy pain that began 1 week ago.  Pt has associated intolerance to PO and unable to keep food down.  Reports chronic etoh abuse drinking close to 1/5th whiskey and 1 bottle wine per day since November. Last drink about 1 week ago.  Pain has resolved since admission.    CXR personally reviewed from - no acute changes.  Normal cardiomediastinal silhouette.     CT abdomen pelvis -   IMPRESSION:  1.  Gallbladder wall thickening and pericholecystic fluid, suspicious for acute cholecystitis. The acute cholecystitis may be secondary to acute pancreatitis.  2.  Acute pancreatitis.  3.  Lobular contours to the left kidney. Although this could be sequelae of scarring, or persistent fetal lobulation, the possibility of renal masses is of greatest rest of the patient. The ultrasound of 2019. Demonstrate cysts within the inferior pole the right kidney. These other lesions may represent benign cysts as well, however, I cannot correlate the location of the lobular margins on today's CT scan with the location of the cyst from the 2019 ultrasound. Therefore additional follow-up is warranted whether that be an additional renal ultrasound, multiphase CT or multiphase MRI.      BUN 13, creat 1.2  K 1.6  Na 134  Bili 1, alk phos 72, ast 46, alt 23    US gb-  IMPRESSION:     1. Moderate hepatic steatosis, which limits sonographic evaluation for subtle masses.  2. Normal gallbladder.  3. Right lower pole 4.5 cm complex cyst, slightly increased in size compared to prior. Recommend renal protocol CT or MRI for  further evaluation on a nonemergent basis.         REVIEW OF SYSTEMS:         Past Medical History:  Past Medical History:   Diagnosis Date    Anxiety     Asthma     occ problems    Breast cancer     right    Cancer 11/2019    right breast- surg scheduled    Constipation     Depression     Diabetes mellitus, type 2     GERD (gastroesophageal reflux disease)     Hyperlipidemia     Hypertension     x yrs    Prediabetes         Past Surgical History:  Past Surgical History:   Procedure Laterality Date    BREAST BIOPSY Right 11/2019    BREAST LUMPECTOMY      SENTINEL LYMPH NODE BIOPSY Right 12/26/2019    Procedure: BIOPSY, LYMPH NODE, SENTINEL;  Surgeon: Luis Manuel Rios MD;  Location: [facility];  Service: General;  Laterality: Right;  SENTINEL @ 9A    TUBAL LIGATION  1938    VAGINAL DELIVERY      x 2        Home Medications:  (Not in a hospital admission)      Inpatient Medications:   folic acid (FOLVITE) IVPB  5 mg Intravenous Daily    [START ON 1/14/2022] levoFLOXacin  750 mg Intravenous Q48H    LIDOcaine (PF) 10 mg/ml (1%)  1 mL Intradermal Once    magnesium sulfate IVPB  2 g Intravenous Once    pantoprazole  40 mg Intravenous Daily    thiamine (VITAMIN B1) IVPB  300 mg Intravenous Daily     albuterol, bisacodyL, calcium chloride IVPB, calcium chloride IVPB, calcium chloride IVPB, dextrose 50%, dextrose 50%, glucagon (human recombinant), glucose, glucose, insulin aspart U-100, lorazepam, magnesium oxide, magnesium sulfate IVPB, magnesium sulfate IVPB, magnesium sulfate IVPB, magnesium sulfate IVPB, morphine, naloxone, potassium chloride in water, potassium chloride in water, potassium chloride in water, potassium chloride in water, potassium chloride, potassium chloride, potassium chloride, potassium chloride, prochlorperazine, sodium chloride 0.9%    Review of patient's allergies indicates:   Allergen Reactions    Amoxicillin Nausea Only and Other (See Comments)       Social History:   Social  "History     Occupational History    Not on file   Tobacco Use    Smoking status: Current Some Day Smoker     Packs/day: 0.25     Types: Cigarettes    Smokeless tobacco: Never Used    Tobacco comment: trying   Substance and Sexual Activity    Alcohol use: Yes     Comment: Socially     Drug use: Yes     Types: Marijuana     Comment: occ    Sexual activity: Not on file       Family History:   Family History   Problem Relation Age of Onset    Breast cancer Sister     Breast cancer Maternal Aunt     Breast cancer Other     Pancreatic cancer Mother     Breast cancer Mother     Breast cancer Paternal Aunt        Review of Systems:  A 10 point review of systems was performed and was normal, except as mentioned in the HPI, including constitutional, HEENT, heme, lymph, cardiovascular, respiratory, gastrointestinal, genitourinary, neurologic, endocrine, psychiatric and musculoskeletal.      OBJECTIVE:    Physical Exam:  24 Hour Vital Sign Ranges: Temp:  [98.5 °F (36.9 °C)] 98.5 °F (36.9 °C)  Pulse:  [] 80  Resp:  [10-27] 12  SpO2:  [91 %-100 %] 98 %  BP: ()/(53-75) 120/65  Most recent vitals: /65   Pulse 80   Temp 98.5 °F (36.9 °C) (Oral)   Resp 12   Ht 5' 3" (1.6 m)   Wt 83 kg (183 lb)   SpO2 98%   BMI 32.42 kg/m²    GEN: well-developed, well-nourished, awake and alert, non-toxic appearing adult  HEENT: PERRL, sclera anicteric, oral mucosa pink and moist without lesion  NECK: trachea midline; Good ROM  CV: regular rate and rhythm, no murmurs or gallops  RESP: clear to auscultation bilaterally, no wheezes, rhonci or rales  ABD: soft, non-tender, non-distended, normal bowel sounds  EXT: no swelling or edema, 2+ pulses distally  SKIN: no rashes or jaundice  PSYCH: normal affect    Labs:   Recent Labs     01/12/22 1953   WBC 12.18   MCV 86        Recent Labs     01/12/22 1954 01/13/22  0450   * 134*   K 1.5* 1.6*   CL 72* 87*   CO2 33* 35*   BUN 18 14    102     No " results for input(s): ALB in the last 72 hours.    Invalid input(s): ALKP, SGOT, SGPT, TBIL, DBIL, TPRO  Recent Labs     01/12/22 2041   INR 1.0         Radiology Review:  US Abdomen Complete   Final Result      CT Abdomen Pelvis  Without Contrast   Final Result      X-Ray Chest AP Portable   Final Result      US Kidney    (Results Pending)         IMPRESSION / RECOMMENDATIONS:  ETOH induced pancreatitis  -recommend iv fluids  -okay for clears since symptoms already better  -avoid etoh       Thank you for this consult.    Marcus Singh  1/13/2022  9:06 AM

## 2022-01-13 NOTE — SUBJECTIVE & OBJECTIVE
Interval History:  No acute events overnight  Patient seen by GI and surgery  Aggressively replacing potassium and magnesium  Patient remains in ICU  Objective:     Vital Signs (Most Recent):  Temp: 98 °F (36.7 °C) (01/13/22 1501)  Pulse: 93 (01/13/22 1630)  Resp: (!) 25 (01/13/22 1630)  BP: (!) 120/93 (01/13/22 1630)  SpO2: (!) 94 % (01/13/22 1630) Vital Signs (24h Range):  Temp:  [98 °F (36.7 °C)-98.5 °F (36.9 °C)] 98 °F (36.7 °C)  Pulse:  [] 93  Resp:  [10-39] 25  SpO2:  [91 %-100 %] 94 %  BP: ()/(53-93) 120/93     Weight: 83 kg (183 lb)  Body mass index is 32.42 kg/m².    Intake/Output Summary (Last 24 hours) at 1/13/2022 1707  Last data filed at 1/13/2022 1706  Gross per 24 hour   Intake 3266.67 ml   Output 1275 ml   Net 1991.67 ml      Physical Exam  Patient appears in no distress lying in bed  Nurse at bedside  HEENT sclerae nonicteric  Neck is supple nontender  Lungs clear to auscultation symmetrical expansion no wheeze  Heart regular rate rhythm  Abdomen is moderately tender diffusely no rebound +BS  Extremities no edema no deformities  Neuro alert oriented x3 motor exam is nonfocal  Psych patient is answering questions appropriately  Cooperative  Significant Labs:   All pertinent labs within the past 24 hours have been reviewed.  BMP:   Recent Labs   Lab 01/13/22  1508         K 2.0*  2.0*   CL 91*   CO2 34*   BUN 10   CREATININE 1.1   CALCIUM 7.9*   MG 2.0     CBC:   Recent Labs   Lab 01/12/22 1953   WBC 12.18   HGB 12.4   HCT 35.5*        CMP:   Recent Labs   Lab 01/12/22 1954 01/12/22 1954 01/13/22  0450 01/13/22  0450 01/13/22  0827 01/13/22  1213 01/13/22  1508   *  --  134*  --   --   --  137   K 1.5*   < > 1.6*   < > 1.8* 1.7* 2.0*  2.0*   CL 72*  --  87*  --   --   --  91*   CO2 33*  --  35*  --   --   --  34*     --  102  --   --   --  100   BUN 18  --  14  --   --   --  10   CREATININE 1.7*  --  1.2  --   --   --  1.1   CALCIUM 7.9*  --  7.3*  --    --   --  7.9*   PROT 7.8  --  6.0  --   --   --  6.2   ALBUMIN 3.6  --  2.7*  --   --   --  2.8*   BILITOT 1.3*  --  1.0  --   --   --  0.8   ALKPHOS 91  --  72  --   --   --  69   AST 68*  --  46*  --   --   --  54*   ALT 29  --  23  --   --   --  23   ANIONGAP 20*  --  12  --   --   --  12   EGFRNONAA 31.9*  --  48.6*  --   --   --  53.9*    < > = values in this interval not displayed.       Significant Imaging:

## 2022-01-13 NOTE — HOSPITAL COURSE
1/13 no acute events overnight  Patient remains in ICU replacing potassium and magnesium.  Patient seen by General surgery and GI  1/14 no acute events overnight  Continue to replace electrolytes  Case discussed with nurse.  Patient requesting to eat  1/15 no acute events overnight.  Potassium still low.  Patient requiring IV supplementation.  Case discussed with nurse.  Patient tolerating diet without difficulty.  Patient seen by surgery but no note available  1/16 No acute events   Patient appears stable for discharge. Lytes much improved  Tolerating diet without problems   Instructed to go dominic AA and avoid all EtOH

## 2022-01-14 LAB
ALBUMIN SERPL BCP-MCNC: 2.9 G/DL (ref 3.5–5.2)
ALP SERPL-CCNC: 75 U/L (ref 55–135)
ALT SERPL W/O P-5'-P-CCNC: 28 U/L (ref 10–44)
ANION GAP SERPL CALC-SCNC: 8 MMOL/L (ref 8–16)
AST SERPL-CCNC: 54 U/L (ref 10–40)
BILIRUB SERPL-MCNC: 0.9 MG/DL (ref 0.1–1)
BUN SERPL-MCNC: 6 MG/DL (ref 8–23)
CA-I BLDV-SCNC: 1.13 MMOL/L (ref 1.06–1.42)
CALCIUM SERPL-MCNC: 8.4 MG/DL (ref 8.7–10.5)
CHLORIDE SERPL-SCNC: 96 MMOL/L (ref 95–110)
CO2 SERPL-SCNC: 33 MMOL/L (ref 23–29)
CREAT SERPL-MCNC: 1 MG/DL (ref 0.5–1.4)
EST. GFR  (AFRICAN AMERICAN): >60 ML/MIN/1.73 M^2
EST. GFR  (NON AFRICAN AMERICAN): >60 ML/MIN/1.73 M^2
GLUCOSE SERPL-MCNC: 124 MG/DL (ref 70–110)
GLUCOSE SERPL-MCNC: 90 MG/DL (ref 70–110)
MAGNESIUM SERPL-MCNC: 1.5 MG/DL (ref 1.6–2.6)
MAGNESIUM SERPL-MCNC: 1.9 MG/DL (ref 1.6–2.6)
MAGNESIUM SERPL-MCNC: 2.5 MG/DL (ref 1.6–2.6)
PHOSPHATE SERPL-MCNC: 1.2 MG/DL (ref 2.7–4.5)
PHOSPHATE SERPL-MCNC: 1.3 MG/DL (ref 2.7–4.5)
POTASSIUM SERPL-SCNC: 2.3 MMOL/L (ref 3.5–5.1)
POTASSIUM SERPL-SCNC: 2.4 MMOL/L (ref 3.5–5.1)
POTASSIUM SERPL-SCNC: 2.6 MMOL/L (ref 3.5–5.1)
POTASSIUM SERPL-SCNC: 2.8 MMOL/L (ref 3.5–5.1)
POTASSIUM SERPL-SCNC: 3 MMOL/L (ref 3.5–5.1)
POTASSIUM SERPL-SCNC: 3.5 MMOL/L (ref 3.5–5.1)
POTASSIUM SERPL-SCNC: 3.5 MMOL/L (ref 3.5–5.1)
PROT SERPL-MCNC: 6.4 G/DL (ref 6–8.4)
SODIUM SERPL-SCNC: 137 MMOL/L (ref 136–145)

## 2022-01-14 PROCEDURE — 84132 ASSAY OF SERUM POTASSIUM: CPT | Mod: 91 | Performed by: HOSPITALIST

## 2022-01-14 PROCEDURE — 99900031 HC PATIENT EDUCATION (STAT)

## 2022-01-14 PROCEDURE — 84100 ASSAY OF PHOSPHORUS: CPT | Mod: 91 | Performed by: HOSPITALIST

## 2022-01-14 PROCEDURE — 63600175 PHARM REV CODE 636 W HCPCS: Performed by: HOSPITALIST

## 2022-01-14 PROCEDURE — 25000003 PHARM REV CODE 250: Performed by: HOSPITALIST

## 2022-01-14 PROCEDURE — 25000003 PHARM REV CODE 250

## 2022-01-14 PROCEDURE — 80053 COMPREHEN METABOLIC PANEL: CPT | Performed by: NURSE PRACTITIONER

## 2022-01-14 PROCEDURE — 83735 ASSAY OF MAGNESIUM: CPT | Performed by: NURSE PRACTITIONER

## 2022-01-14 PROCEDURE — 63600175 PHARM REV CODE 636 W HCPCS: Performed by: NURSE PRACTITIONER

## 2022-01-14 PROCEDURE — 99900035 HC TECH TIME PER 15 MIN (STAT)

## 2022-01-14 PROCEDURE — 25000003 PHARM REV CODE 250: Performed by: NURSE PRACTITIONER

## 2022-01-14 PROCEDURE — 82330 ASSAY OF CALCIUM: CPT | Performed by: HOSPITALIST

## 2022-01-14 PROCEDURE — 94761 N-INVAS EAR/PLS OXIMETRY MLT: CPT

## 2022-01-14 PROCEDURE — 83735 ASSAY OF MAGNESIUM: CPT | Mod: 91 | Performed by: HOSPITALIST

## 2022-01-14 PROCEDURE — 20000000 HC ICU ROOM

## 2022-01-14 PROCEDURE — C9113 INJ PANTOPRAZOLE SODIUM, VIA: HCPCS | Performed by: NURSE PRACTITIONER

## 2022-01-14 PROCEDURE — 84100 ASSAY OF PHOSPHORUS: CPT | Performed by: NURSE PRACTITIONER

## 2022-01-14 RX ORDER — POTASSIUM CHLORIDE 7.45 MG/ML
60 INJECTION INTRAVENOUS
Status: DISCONTINUED | OUTPATIENT
Start: 2022-01-14 | End: 2022-01-16 | Stop reason: HOSPADM

## 2022-01-14 RX ORDER — LEVOFLOXACIN 5 MG/ML
750 INJECTION, SOLUTION INTRAVENOUS
Status: DISCONTINUED | OUTPATIENT
Start: 2022-01-14 | End: 2022-01-15

## 2022-01-14 RX ORDER — CHLORHEXIDINE GLUCONATE ORAL RINSE 1.2 MG/ML
15 SOLUTION DENTAL 2 TIMES DAILY
Status: DISCONTINUED | OUTPATIENT
Start: 2022-01-14 | End: 2022-01-16 | Stop reason: HOSPADM

## 2022-01-14 RX ORDER — MUPIROCIN 20 MG/G
OINTMENT TOPICAL 2 TIMES DAILY
Status: DISCONTINUED | OUTPATIENT
Start: 2022-01-14 | End: 2022-01-16 | Stop reason: HOSPADM

## 2022-01-14 RX ADMIN — MORPHINE SULFATE 2 MG: 2 INJECTION, SOLUTION INTRAMUSCULAR; INTRAVENOUS at 10:01

## 2022-01-14 RX ADMIN — MAGNESIUM SULFATE 2 G: 2 INJECTION INTRAVENOUS at 04:01

## 2022-01-14 RX ADMIN — POTASSIUM CHLORIDE 40 MEQ: 20 TABLET, EXTENDED RELEASE ORAL at 04:01

## 2022-01-14 RX ADMIN — MORPHINE SULFATE 2 MG: 2 INJECTION, SOLUTION INTRAMUSCULAR; INTRAVENOUS at 09:01

## 2022-01-14 RX ADMIN — MUPIROCIN: 20 OINTMENT TOPICAL at 08:01

## 2022-01-14 RX ADMIN — POTASSIUM CHLORIDE 40 MEQ: 7.46 INJECTION, SOLUTION INTRAVENOUS at 10:01

## 2022-01-14 RX ADMIN — MAGNESIUM SULFATE 4 G: 2 INJECTION INTRAVENOUS at 11:01

## 2022-01-14 RX ADMIN — POTASSIUM CHLORIDE 60 MEQ: 7.46 INJECTION, SOLUTION INTRAVENOUS at 04:01

## 2022-01-14 RX ADMIN — CALCIUM CHLORIDE 1 G: 100 INJECTION, SOLUTION INTRAVENOUS at 11:01

## 2022-01-14 RX ADMIN — POTASSIUM PHOSPHATE, MONOBASIC AND POTASSIUM PHOSPHATE, DIBASIC 15 MMOL: 224; 236 INJECTION, SOLUTION, CONCENTRATE INTRAVENOUS at 04:01

## 2022-01-14 RX ADMIN — FOLIC ACID 5 MG: 5 INJECTION, SOLUTION INTRAMUSCULAR; INTRAVENOUS; SUBCUTANEOUS at 08:01

## 2022-01-14 RX ADMIN — POTASSIUM CHLORIDE 40 MEQ: 7.46 INJECTION, SOLUTION INTRAVENOUS at 12:01

## 2022-01-14 RX ADMIN — PANTOPRAZOLE SODIUM 40 MG: 40 INJECTION, POWDER, FOR SOLUTION INTRAVENOUS at 08:01

## 2022-01-14 RX ADMIN — POTASSIUM CHLORIDE 40 MEQ: 20 TABLET, EXTENDED RELEASE ORAL at 12:01

## 2022-01-14 RX ADMIN — CHLORHEXIDINE GLUCONATE 15 ML: 1.2 RINSE ORAL at 08:01

## 2022-01-14 RX ADMIN — LOSARTAN POTASSIUM 25 MG: 25 TABLET, FILM COATED ORAL at 08:01

## 2022-01-14 RX ADMIN — THIAMINE HYDROCHLORIDE 300 MG: 100 INJECTION, SOLUTION INTRAMUSCULAR; INTRAVENOUS at 08:01

## 2022-01-14 RX ADMIN — SODIUM CHLORIDE: 0.9 INJECTION, SOLUTION INTRAVENOUS at 08:01

## 2022-01-14 RX ADMIN — SODIUM PHOSPHATE, MONOBASIC, MONOHYDRATE AND SODIUM PHOSPHATE, DIBASIC, ANHYDROUS 30 MMOL: 276; 142 INJECTION, SOLUTION INTRAVENOUS at 10:01

## 2022-01-14 RX ADMIN — LEVOFLOXACIN 750 MG: 750 INJECTION, SOLUTION INTRAVENOUS at 08:01

## 2022-01-14 RX ADMIN — POTASSIUM CHLORIDE 40 MEQ: 20 TABLET, EXTENDED RELEASE ORAL at 11:01

## 2022-01-14 NOTE — CARE UPDATE
01/14/22 1006   Patient Assessment/Suction   Level of Consciousness (AVPU) alert   All Lung Fields Breath Sounds clear   PRE-TX-O2   O2 Device (Oxygen Therapy) room air   Pulse Oximetry Type Continuous   $ Pulse Oximetry - Multiple Charge Pulse Oximetry - Multiple   Inhaler   $ Inhaler Charges PRN treatment not required   Education   $ Education Bronchodilator;15 min   Respiratory Evaluation   $ Care Plan Tech Time 15 min

## 2022-01-14 NOTE — CONSULTS
[facility]  Adult Nutrition   Consult Note    SUMMARY     Recommendations  Recommendation/Intervention:   1. Diet advanced to regular diet.   2. RD added ensure enlive TID (to provide 1050 kcal/day and 60 g/day protein).   3. Hypokalemia, hypomagnesemia, and hypophosphatemia.  Recommend continued monitoring and management of  electrolytes.   4. Continue vitamin/mineral supplementation in relation to alcohol withdrawal/chronic alcohol use.   5. Recommend that a medical diagnosis of severe malnutrition be added to patient's problem list if physician agrees with dietitian's Nutrition Focused Physical Exam (NFPE) findings that support medical diagnosis per ASPEN guidelines.  Goals:   1. Patient to tolerate diet.  2. Patient to meet at least 75% of estimated energy and protein needs.   3. Electrolytes to trend towards target range.   4. Malnutrition to be added to problem list.  Nutrition Goal Status: new  Communication of RD Recs: reviewed with RN    Dietitian Rounds Brief  · Patient assessed 2' consult, pancreatitis and MST score of 5. Patient started on CLD. Patient tolerating clear liquids. RD offered ONS. Patient states she will try. Patient states she is really hungry and wants to eat, wants hamburger. Diet advanced after rounds to regular.  · RD obtained nutrition history. Patient states she is very depressed and has been drinking a lot, does not eat when she drinks. If she does eat she only typically eats fruits (canned peaches, bananas, strawberries, grapes). RD visually assessed patient, noted moderate muscle wasting (temples), mild fat wasting (orbital, triceps)     Malnutrition Assessment  RD suspects malnutrition. RD noted clinical characteristics that support a diagnosis of malnutrition. Assessment is based on medical records, interview with patient, and visible physical findings. For patient and clinician safety, physical contact (palpation) was not utilized during assessment due to the  "COVID-19 pandemic.    Nutritional Diagnosis (PES Statement)   · Malnutrition in the context of acute illness or injury in addition to social or environmental circumstances.   · Severe malnutrition related to inadequate protein energy intake and inadequate oral intake with excessive alcohol intake as evidence by alcohol induced pancreatitis nausea and vomiting with inability to keep foods down x at least 7 days, severe weight loss of 12.6 kg (13.18%) in less than 3 months, patient drinking 1/5th whisky and 1 bottle of wine daily since 2021 (stopped drinking about 5 days ago); visible moderate muscle wasting (temples), and visible mild fat wasting (orbital, triceps)     Reason for Assessment  Reason For Assessment: consult,identified at risk by screening criteria (MST 5)  Relevant Medical History: pancreatits; GERD; breast cancer; DM2; HLD; HTN  Interdisciplinary Rounds: attended    Nutrition Risk Screen  Nutrition Risk Screen: other (see comments) (pancreatitis)     MST Score: 5  Have you recently lost weight without trying?: Yes: 34 lbs or more  Weight loss score: 4  Have you been eating poorly because of a decreased appetite?: Yes  Appetite score: 1       Nutrition/Diet History  Food Allergies: NKFA  Factors Affecting Nutritional Intake: nausea/vomiting,clear liquid diet,excessive alcohol intake    Anthropometrics  Temp: 98 °F (36.7 °C)  Height Method: Stated  Height: 5' 3" (160 cm)  Height (inches): 63 in  Weight Method: Bed Scale  Weight: 83 kg (183 lb)  Weight (lb): 183 lb  Ideal Body Weight (IBW), Female: 115 lb  % Ideal Body Weight, Female (lb): 159.13 %  BMI (Calculated): 32.4  BMI Grade: 30 - 34.9- obesity - grade I  Weight Loss: unintentional  Usual Body Weight (UBW), k.6 kg  % Usual Body Weight: 87.01  % Weight Change From Usual Weight: -13.17 %       Weight History:  Wt Readings from Last 10 Encounters:   22 83 kg (183 lb)   21 95.6 kg (210 lb 12.2 oz)   10/26/20 95.6 kg (210 lb " 11.2 oz)   01/31/20 94.3 kg (208 lb)   01/30/20 94.3 kg (208 lb)   01/27/20 94.8 kg (208 lb 14.4 oz)   01/06/20 91.6 kg (202 lb)   12/26/19 91.6 kg (202 lb)   12/23/19 91.9 kg (202 lb 9 oz)   12/23/19 93 kg (205 lb)       Lab/Procedures/Meds: Pertinent Labs Reviewed    Clinical Chemistry:  Recent Labs   Lab 01/12/22 1954 01/12/22 1954 01/13/22  0450 01/13/22  0827 01/14/22 0324 01/14/22 0324 01/14/22  0915   *   < > 134*   < > 137  --   --    K 1.5*   < > 1.6*   < > 2.4*  2.4*  2.4*   < > 3.0*   CL 72*   < > 87*   < > 96  --   --    CO2 33*   < > 35*   < > 33*  --   --       < > 102   < > 90  --   --    BUN 18   < > 14   < > 6*  --   --    CREATININE 1.7*   < > 1.2   < > 1.0  --   --    CALCIUM 7.9*   < > 7.3*   < > 8.4*  --   --    PROT 7.8   < > 6.0   < > 6.4  --   --    ALBUMIN 3.6   < > 2.7*   < > 2.9*  --   --    BILITOT 1.3*   < > 1.0   < > 0.9  --   --    ALKPHOS 91   < > 72   < > 75  --   --    AST 68*   < > 46*   < > 54*  --   --    ALT 29   < > 23   < > 28  --   --    ANIONGAP 20*   < > 12   < > 8  --   --    ESTGFRAFRICA 36.7*   < > 56.0*   < > >60.0  --   --    EGFRNONAA 31.9*   < > 48.6*   < > >60.0  --   --    MG 1.7   < > 1.8   < > 1.5*   < > 1.9   PHOS  --   --  1.5*  --  1.2*  --   --    LIPASE 134*  --   --   --   --   --   --     < > = values in this interval not displayed.       CBC:   Recent Labs   Lab 01/12/22 1953   WBC 12.18   RBC 4.11   HGB 12.4   HCT 35.5*      MCV 86   MCH 30.2   MCHC 34.9       Lipid Panel:  Recent Labs   Lab 01/13/22 1802   CHOL 89*   HDL 34*   LDLCALC 41.0*   TRIG 70   CHOLHDL 38.2       Cardiac Profile:  Recent Labs   Lab 01/12/22 1953 01/12/22 1954 01/13/22 0145 01/13/22  0827   BNP 62  --   --   --    TROPONINI  --  0.043* 0.038 0.037       Thyroid & Parathyroid:  Recent Labs   Lab 01/12/22 1954   TSH 1.330         Medications: Pertinent Medications reviewed    Scheduled Meds:   chlorhexidine  15 mL Mouth/Throat BID    folic acid  (FOLVITE) IVPB  5 mg Intravenous Daily    levoFLOXacin  750 mg Intravenous Q24H    losartan  25 mg Oral Daily    mupirocin   Nasal BID    pantoprazole  40 mg Intravenous Daily    thiamine (VITAMIN B1) IVPB  300 mg Intravenous Daily       Continuous Infusions:   sodium chloride 0.9% 150 mL/hr at 01/14/22 0826       PRN Meds:.albuterol, bisacodyL, calcium chloride IVPB, calcium chloride IVPB, calcium chloride IVPB, calcium gluconate IVPB, calcium gluconate IVPB, calcium gluconate IVPB, dextrose 50%, dextrose 50%, glucagon (human recombinant), glucose, glucose, insulin aspart U-100, lorazepam, magnesium oxide, magnesium sulfate IVPB, magnesium sulfate IVPB, magnesium sulfate IVPB, magnesium sulfate IVPB, morphine, naloxone, potassium chloride in water, potassium chloride in water, potassium chloride in water, potassium chloride in water, potassium chloride in water, potassium chloride, potassium chloride, potassium chloride, potassium chloride, prochlorperazine, sodium chloride 0.9%    Estimated/Assessed Needs  Weight Used For Calorie Calculations: 83 kg (182 lb 15.7 oz)  Energy Calorie Requirements (kcal): 2075 - 2490 (25 - 30 kcal/kg)  Energy Need Method: Kcal/kg  Protein Requirements: 104 - 130 (2 - 2.5 g/kg IBW)  Weight Used For Protein Calculations: 52 kg (114 lb 10.2 oz) (IBW)     Estimated Fluid Requirement Method: RDA Method  RDA Method (mL): 2075       Nutrition Prescription Ordered  Current Diet Order: Clear Liquid    Evaluation of Received Nutrient/Fluid Intake  Energy Calories Required: not meeting needs  Protein Required: not meeting needs  Fluid Required: meeting needs  Tolerance: tolerating     Intake/Output Summary (Last 24 hours) at 1/14/2022 1510  Last data filed at 1/14/2022 1300  Gross per 24 hour   Intake 7147.5 ml   Output 7000 ml   Net 147.5 ml      % Intake of Estimated Energy Needs: 0 - 25 %  % Meal Intake: 0 - 25 %    Nutrition Risk  Level of Risk/Frequency of Follow-up: high     Monitor  and Evaluation  Food and Nutrient Intake: energy intake,food and beverage intake  Food and Nutrient Adminstration: diet order  Physical Activity and Function: nutrition-related ADLs and IADLs,factors affecting access to physical activity  Anthropometric Measurements: weight,weight change,body mass index  Biochemical Data, Medical Tests and Procedures: electrolyte and renal panel,lipid profile,gastrointestinal profile,glucose/endocrine profile,inflammatory profile  Nutrition-Focused Physical Findings: overall appearance     Nutrition Follow-Up  RD Follow-up?: Yes    Tonja Stinson RD 01/14/2022 3:11 PM

## 2022-01-14 NOTE — SUBJECTIVE & OBJECTIVE
Interval History:  No acute events overnight  Continuing to replace electrolytes  Patient requesting eat   Surgery eval pending  Objective:     Vital Signs (Most Recent):  Temp: 98 °F (36.7 °C) (01/14/22 0701)  Pulse: 89 (01/14/22 0701)  Resp: 15 (01/14/22 0701)  BP: (!) 140/76 (01/14/22 0701)  SpO2: (!) 93 % (01/14/22 0701) Vital Signs (24h Range):  Temp:  [97.7 °F (36.5 °C)-98 °F (36.7 °C)] 98 °F (36.7 °C)  Pulse:  [] 89  Resp:  [12-40] 15  SpO2:  [91 %-100 %] 93 %  BP: ()/(59-93) 140/76     Weight: 83 kg (183 lb)  Body mass index is 32.42 kg/m².    Intake/Output Summary (Last 24 hours) at 1/14/2022 0822  Last data filed at 1/14/2022 0701  Gross per 24 hour   Intake 5916.67 ml   Output 3500 ml   Net 2416.67 ml      Physical Exam  Patient appears no distress lying in bed  HEENT sclerae nonicteric  Neck is supple nontender  Lungs clear to auscultation  Heart regular rate rhythm  Abdomen is obese soft minimal tenderness diffusely  +BS  Extremities no edema no deformities   Exam no Lopez  Neuro patient is alert oriented x3 motor exam is nonfocal    Significant Labs:   All pertinent labs within the past 24 hours have been reviewed.  BMP:   Recent Labs   Lab 01/14/22 0324   GLU 90      K 2.4*  2.4*  2.4*   CL 96   CO2 33*   BUN 6*   CREATININE 1.0   CALCIUM 8.4*   MG 1.5*     CBC:   Recent Labs   Lab 01/12/22  1953   WBC 12.18   HGB 12.4   HCT 35.5*        CMP:   Recent Labs   Lab 01/13/22  0450 01/13/22  0827 01/13/22  1508 01/13/22  1508 01/13/22  1802 01/13/22  2337 01/14/22  0324   *  --  137  --   --   --  137   K 1.6*   < > 2.0*  2.0*   < > 2.0*  2.0* 2.3*  2.3*  2.3*  2.3* 2.4*  2.4*  2.4*   CL 87*  --  91*  --   --   --  96   CO2 35*  --  34*  --   --   --  33*     --  100  --   --   --  90   BUN 14  --  10  --   --   --  6*   CREATININE 1.2  --  1.1  --   --   --  1.0   CALCIUM 7.3*  --  7.9*  --   --   --  8.4*   PROT 6.0  --  6.2  --   --   --  6.4   ALBUMIN  2.7*  --  2.8*  --   --   --  2.9*   BILITOT 1.0  --  0.8  --   --   --  0.9   ALKPHOS 72  --  69  --   --   --  75   AST 46*  --  54*  --   --   --  54*   ALT 23  --  23  --   --   --  28   ANIONGAP 12  --  12  --   --   --  8   EGFRNONAA 48.6*  --  53.9*  --   --   --  >60.0    < > = values in this interval not displayed.       Significant Imaging:

## 2022-01-14 NOTE — CARE UPDATE
01/13/22 2005   Patient Assessment/Suction   Level of Consciousness (AVPU) alert   Respiratory Effort Unlabored   Expansion/Accessory Muscles/Retractions no use of accessory muscles   All Lung Fields Breath Sounds clear;diminished   Rhythm/Pattern, Respiratory no shortness of breath reported   Cough Frequency no cough   PRE-TX-O2   O2 Device (Oxygen Therapy) nasal cannula   SpO2 97 %   Pulse Oximetry Type Continuous   $ Pulse Oximetry - Multiple Charge Pulse Oximetry - Multiple   Pulse 82   Resp (!) 22   Positioning   Head of Bed (HOB) Positioning HOB elevated;HOB at 30 degrees   Respiratory Evaluation   $ Care Plan Tech Time 15 min

## 2022-01-14 NOTE — PLAN OF CARE
Problem: Malnutrition  Goal: Improved Nutritional Intake  Outcome: Ongoing, Progressing  Intervention: Promote and Optimize Oral Intake  Flowsheets (Taken 1/14/2022 1513)  Oral Nutrition Promotion:   calorie-dense liquids provided   nutritional therapy counseling provided

## 2022-01-14 NOTE — PROGRESS NOTES
Former[facility] Medicine  Progress Note    Patient Name: Claire Mcnamara  MRN: [id number]  Patient Class: IP- Inpatient   Admission Date: 1/12/2022  Length of Stay: 2 days  Attending Physician: Imtiaz Peacock DO  Primary Care Provider: Luis Manuel Montalvo DO        Subjective:     Principal Problem:<principal problem not specified>        HPI:  No notes on file    Overview/Hospital Course:  1/13 no acute events overnight  Patient remains in ICU replacing potassium and magnesium.  Patient seen by General surgery and GI  1/14 no acute events overnight  Continue to replace electrolytes  Case discussed with nurse.  Patient requesting to eat        Interval History:  No acute events overnight  Continuing to replace electrolytes  Patient requesting eat   Surgery eval pending  Objective:     Vital Signs (Most Recent):  Temp: 98 °F (36.7 °C) (01/14/22 0701)  Pulse: 89 (01/14/22 0701)  Resp: 15 (01/14/22 0701)  BP: (!) 140/76 (01/14/22 0701)  SpO2: (!) 93 % (01/14/22 0701) Vital Signs (24h Range):  Temp:  [97.7 °F (36.5 °C)-98 °F (36.7 °C)] 98 °F (36.7 °C)  Pulse:  [] 89  Resp:  [12-40] 15  SpO2:  [91 %-100 %] 93 %  BP: ()/(59-93) 140/76     Weight: 83 kg (183 lb)  Body mass index is 32.42 kg/m².    Intake/Output Summary (Last 24 hours) at 1/14/2022 0822  Last data filed at 1/14/2022 0701  Gross per 24 hour   Intake 5916.67 ml   Output 3500 ml   Net 2416.67 ml      Physical Exam  Patient appears no distress lying in bed  HEENT sclerae nonicteric  Neck is supple nontender  Lungs clear to auscultation  Heart regular rate rhythm  Abdomen is obese soft minimal tenderness diffusely  +BS  Extremities no edema no deformities   Exam no Lopez  Neuro patient is alert oriented x3 motor exam is nonfocal    Significant Labs:   All pertinent labs within the past 24 hours have been reviewed.  BMP:   Recent Labs   Lab 01/14/22  0324   GLU 90      K 2.4*  2.4*  2.4*   CL 96   CO2 33*   BUN 6*    CREATININE 1.0   CALCIUM 8.4*   MG 1.5*     CBC:   Recent Labs   Lab 01/12/22  1953   WBC 12.18   HGB 12.4   HCT 35.5*        CMP:   Recent Labs   Lab 01/13/22  0450 01/13/22  0827 01/13/22  1508 01/13/22  1508 01/13/22  1802 01/13/22  2337 01/14/22  0324   *  --  137  --   --   --  137   K 1.6*   < > 2.0*  2.0*   < > 2.0*  2.0* 2.3*  2.3*  2.3*  2.3* 2.4*  2.4*  2.4*   CL 87*  --  91*  --   --   --  96   CO2 35*  --  34*  --   --   --  33*     --  100  --   --   --  90   BUN 14  --  10  --   --   --  6*   CREATININE 1.2  --  1.1  --   --   --  1.0   CALCIUM 7.3*  --  7.9*  --   --   --  8.4*   PROT 6.0  --  6.2  --   --   --  6.4   ALBUMIN 2.7*  --  2.8*  --   --   --  2.9*   BILITOT 1.0  --  0.8  --   --   --  0.9   ALKPHOS 72  --  69  --   --   --  75   AST 46*  --  54*  --   --   --  54*   ALT 23  --  23  --   --   --  28   ANIONGAP 12  --  12  --   --   --  8   EGFRNONAA 48.6*  --  53.9*  --   --   --  >60.0    < > = values in this interval not displayed.       Significant Imaging:       Assessment/Plan:       #1 EtOH induced pancreatitis-  GI following, continue IV fluids.  Clears   R/O cholecystitis-surgery eval pending  #2 Severe hypokalemia-replacing.  Serial labs  Continue ICU monitoring  #3 Hypo magnesium-replacing  #4 EtOH use disorder ?  Dependence-monitoring  #5 Severe depression-      Case discussed with nurse  Overall improved.  Clinically it does not appear that patient has acute cholecystitis will await surgery eval  Monitoring serial labs.  Patient requiring IV electrolyte replacement  VTE Risk Mitigation (From admission, onward)         Ordered     Place sequential compression device  Until discontinued         01/12/22 2203     IP VTE HIGH RISK PATIENT  Once         01/12/22 2109                Discharge Planning   YOVANNY: 1/17/2022     Code Status: Full Code   Is the patient medically ready for discharge?:     Reason for patient still in hospital (select all that  apply): Patient trending condition                     Imtiaz Peacock DO  Department of Hospital Medicine   [facility]

## 2022-01-14 NOTE — CONSULTS
[facility]  General Surgery  Consult Note    Consults  Subjective:     Chief Complaint/Reason for Admission:  Admitted with alcohol-related pancreatitis.  Surgery consulted for abnormal appearance of gallbladder on imaging.    History of Present Illness: Patient is 61 year old female admitted to the ICU with alcohol related pancreatitis and severe hypokalemia to 1.8. CT showed gallbladder wall thickening and pericholecystic fluid but could be due to secondary inflammation from the pancreas. US showed normal appearing gallbladder with no stones, no wall thickening or pericholecystic fluid. She is awake and alert. Report pain across the upper abdomen. She is hemodynamically stable. Afebrile. She reports no abdominal surgical history.     No current facility-administered medications on file prior to encounter.     Current Outpatient Medications on File Prior to Encounter   Medication Sig    albuterol (PROVENTIL/VENTOLIN HFA) 90 mcg/actuation inhaler Inhale 2 puffs into the lungs every 4 (four) hours as needed.    amLODIPine (NORVASC) 10 MG tablet Take 10 mg by mouth once daily.    diphenhydrAMINE (BENADRYL) 25 mg capsule Take 25 mg by mouth every 6 (six) hours as needed for Itching.    FLUoxetine 40 MG capsule Take 1 capsule by mouth once daily.    hydroCHLOROthiazide (HYDRODIURIL) 25 MG tablet Take 1 tablet by mouth once daily.    letrozole (FEMARA) 2.5 mg Tab TAKE ONE TABLET BY MOUTH EVERY DAY. (Patient taking differently: Take 2.5 mg by mouth once daily.)    LORazepam (ATIVAN) 0.5 MG tablet Take 0.5 mg by mouth 2 (two) times daily as needed.     losartan (COZAAR) 25 MG tablet Take 25 mg by mouth once daily.    metFORMIN (GLUCOPHAGE) 1000 MG tablet Take 1 tablet by mouth every evening.    montelukast (SINGULAIR) 10 mg tablet Take 10 mg by mouth once daily.     omeprazole (PRILOSEC) 40 MG capsule Take 1 capsule by mouth before breakfast.    rosuvastatin (CRESTOR) 10 MG tablet Take 1 tablet  by mouth once daily.    vitamin D (VITAMIN D3) 1000 units Tab Take 1,000 Units by mouth once daily.       Review of patient's allergies indicates:   Allergen Reactions    Amoxicillin Nausea Only and Other (See Comments)       Past Medical History:   Diagnosis Date    Anxiety     Asthma     occ problems    Breast cancer     right    Cancer 11/2019    right breast- surg scheduled    Constipation     Depression     Diabetes mellitus, type 2     GERD (gastroesophageal reflux disease)     Hyperlipidemia     Hypertension     x yrs    Prediabetes      Past Surgical History:   Procedure Laterality Date    BREAST BIOPSY Right 11/2019    BREAST LUMPECTOMY      SENTINEL LYMPH NODE BIOPSY Right 12/26/2019    Procedure: BIOPSY, LYMPH NODE, SENTINEL;  Surgeon: Luis Manuel Rios MD;  Location: [facility];  Service: General;  Laterality: Right;  SENTINEL @ 9A    TUBAL LIGATION  1938    VAGINAL DELIVERY      x 2     Family History     Problem Relation (Age of Onset)    Breast cancer Sister, Maternal Aunt, Other, Mother, Paternal Aunt    Pancreatic cancer Mother        Tobacco Use    Smoking status: Current Some Day Smoker     Packs/day: 0.25     Types: Cigarettes    Smokeless tobacco: Never Used    Tobacco comment: trying   Substance and Sexual Activity    Alcohol use: Yes     Comment: Socially     Drug use: Yes     Types: Marijuana     Comment: occ    Sexual activity: Not on file     Review of Systems   Constitutional: Negative for appetite change, chills, fever and unexpected weight change.   HENT: Negative for hearing loss, rhinorrhea, sore throat and voice change.    Eyes: Negative for photophobia and visual disturbance.   Respiratory: Negative for cough, choking and shortness of breath.    Cardiovascular: Negative for chest pain, palpitations and leg swelling.   Gastrointestinal: Positive for abdominal pain. Negative for blood in stool, constipation, diarrhea, nausea and vomiting.   Endocrine: Negative for  cold intolerance, heat intolerance, polydipsia and polyuria.   Musculoskeletal: Negative for arthralgias, back pain, joint swelling and neck stiffness.   Skin: Negative for color change, pallor and rash.   Neurological: Negative for dizziness, seizures, syncope and headaches.   Hematological: Negative for adenopathy. Does not bruise/bleed easily.   Psychiatric/Behavioral: Negative for agitation, behavioral problems and confusion.     Objective:     Vital Signs (Most Recent):  Temp: 97.7 °F (36.5 °C) (01/13/22 1930)  Pulse: 91 (01/13/22 2000)  Resp: (!) 36 (01/13/22 2000)  BP: 113/76 (01/13/22 2000)  SpO2: 97 % (01/13/22 2000) Vital Signs (24h Range):  Temp:  [97.7 °F (36.5 °C)-98 °F (36.7 °C)] 97.7 °F (36.5 °C)  Pulse:  [] 91  Resp:  [10-40] 36  SpO2:  [91 %-100 %] 97 %  BP: ()/(53-93) 113/76     Weight: 83 kg (183 lb)  Body mass index is 32.42 kg/m².      Intake/Output Summary (Last 24 hours) at 1/13/2022 2058  Last data filed at 1/13/2022 1826  Gross per 24 hour   Intake 3506.67 ml   Output 2275 ml   Net 1231.67 ml       Physical Exam  Vitals reviewed.   Constitutional:       General: She is awake. She is not in acute distress.     Appearance: She is obese. She is not toxic-appearing.   HENT:      Head: Normocephalic and atraumatic.   Abdominal:      General: There is no distension.      Palpations: Abdomen is soft.      Tenderness: There is abdominal tenderness in the epigastric area. There is no guarding or rebound.      Comments: mild tenderness epigastrium.    Musculoskeletal:      Cervical back: Neck supple.   Skin:     Coloration: Skin is not jaundiced.   Neurological:      Mental Status: She is alert and oriented to person, place, and time.         Significant Labs:  CBC:   Recent Labs   Lab 01/12/22 1953   WBC 12.18   RBC 4.11   HGB 12.4   HCT 35.5*      MCV 86   MCH 30.2   MCHC 34.9     CMP:   Recent Labs   Lab 01/13/22  1508 01/13/22  1508 01/13/22  1802     --   --    CALCIUM  7.9*  --   --    ALBUMIN 2.8*  --   --    PROT 6.2  --   --      --   --    K 2.0*  2.0*   < > 2.0*  2.0*   CO2 34*  --   --    CL 91*  --   --    BUN 10  --   --    CREATININE 1.1  --   --    ALKPHOS 69  --   --    ALT 23  --   --    AST 54*  --   --    BILITOT 0.8  --   --     < > = values in this interval not displayed.       Significant Diagnostics:  I have reviewed all pertinent imaging results/findings within the past 24 hours.    Assessment/Plan:   Acute alcohol related pancreatitis.   Severe hypokalemia  Alcohol dependence     -Admitted with pancreatitis and severe hypokalemia. Gallbladder normal on US. Pericholecystic fluid on CT but probably secondarily inflamed from the pancreas. I am comfortable observing gallbladder for now. Will follow clinically.     Thank you for your consult.     Doe Phillips III, MD  General Surgery  [facility]

## 2022-01-14 NOTE — PROGRESS NOTES
Pharmacist Renal Dose Adjustment Note    Claire Mcnamara is a 61 y.o. female being treated with the medication Levofloxacin    Patient Data:    Vital Signs (Most Recent):  Temp: 97.7 °F (36.5 °C) (01/14/22 0310)  Pulse: 93 (01/14/22 0600)  Resp: 15 (01/14/22 0600)  BP: 138/84 (01/14/22 0400)  SpO2: 96 % (01/14/22 0600) Vital Signs (72h Range):  Temp:  [97.7 °F (36.5 °C)-98.5 °F (36.9 °C)]   Pulse:  []   Resp:  [10-40]   BP: ()/(53-93)   SpO2:  [91 %-100 %]      Recent Labs   Lab 01/13/22  0450 01/13/22  1508 01/14/22  0324   CREATININE 1.2 1.1 1.0     Serum creatinine: 1 mg/dL 01/14/22 0324  Estimated creatinine clearance: 59.5 mL/min    Levofloxacin 750 mg IV q 48 hrs will be changed to Levofloxacin 750 mg IV q 24 hrs due to CrCl > 49 ml/min    Pharmacist's Name: Ron Kearns  Pharmacist's Extension: [phone]

## 2022-01-15 LAB
ALBUMIN SERPL BCP-MCNC: 2.8 G/DL (ref 3.5–5.2)
ALP SERPL-CCNC: 75 U/L (ref 55–135)
ALT SERPL W/O P-5'-P-CCNC: 25 U/L (ref 10–44)
ANION GAP SERPL CALC-SCNC: 12 MMOL/L (ref 8–16)
AST SERPL-CCNC: 33 U/L (ref 10–40)
BASOPHILS # BLD AUTO: 0.04 K/UL (ref 0–0.2)
BASOPHILS NFR BLD: 0.4 % (ref 0–1.9)
BILIRUB SERPL-MCNC: 0.6 MG/DL (ref 0.1–1)
BUN SERPL-MCNC: <5 MG/DL (ref 8–23)
CA-I BLDV-SCNC: 1.07 MMOL/L (ref 1.06–1.42)
CA-I BLDV-SCNC: 1.18 MMOL/L (ref 1.06–1.42)
CALCIUM SERPL-MCNC: 8 MG/DL (ref 8.7–10.5)
CHLORIDE SERPL-SCNC: 98 MMOL/L (ref 95–110)
CO2 SERPL-SCNC: 28 MMOL/L (ref 23–29)
CREAT SERPL-MCNC: 0.8 MG/DL (ref 0.5–1.4)
DIFFERENTIAL METHOD: ABNORMAL
EOSINOPHIL # BLD AUTO: 0.2 K/UL (ref 0–0.5)
EOSINOPHIL NFR BLD: 2.3 % (ref 0–8)
ERYTHROCYTE [DISTWIDTH] IN BLOOD BY AUTOMATED COUNT: 13.3 % (ref 11.5–14.5)
EST. GFR  (AFRICAN AMERICAN): >60 ML/MIN/1.73 M^2
EST. GFR  (NON AFRICAN AMERICAN): >60 ML/MIN/1.73 M^2
GLUCOSE SERPL-MCNC: 153 MG/DL (ref 70–110)
HCT VFR BLD AUTO: 28.1 % (ref 37–48.5)
HGB BLD-MCNC: 9.5 G/DL (ref 12–16)
IMM GRANULOCYTES # BLD AUTO: 0.07 K/UL (ref 0–0.04)
IMM GRANULOCYTES NFR BLD AUTO: 0.7 % (ref 0–0.5)
LYMPHOCYTES # BLD AUTO: 1.8 K/UL (ref 1–4.8)
LYMPHOCYTES NFR BLD: 18 % (ref 18–48)
MAGNESIUM SERPL-MCNC: 1.5 MG/DL (ref 1.6–2.6)
MAGNESIUM SERPL-MCNC: 1.6 MG/DL (ref 1.6–2.6)
MAGNESIUM SERPL-MCNC: 1.9 MG/DL (ref 1.6–2.6)
MCH RBC QN AUTO: 30.1 PG (ref 27–31)
MCHC RBC AUTO-ENTMCNC: 33.8 G/DL (ref 32–36)
MCV RBC AUTO: 89 FL (ref 82–98)
MONOCYTES # BLD AUTO: 1 K/UL (ref 0.3–1)
MONOCYTES NFR BLD: 9.7 % (ref 4–15)
NEUTROPHILS # BLD AUTO: 6.9 K/UL (ref 1.8–7.7)
NEUTROPHILS NFR BLD: 68.9 % (ref 38–73)
NRBC BLD-RTO: 0 /100 WBC
PHOSPHATE SERPL-MCNC: 2 MG/DL (ref 2.7–4.5)
PHOSPHATE SERPL-MCNC: 3.5 MG/DL (ref 2.7–4.5)
PLATELET # BLD AUTO: 253 K/UL (ref 150–450)
PMV BLD AUTO: 9.4 FL (ref 9.2–12.9)
POTASSIUM SERPL-SCNC: 2.9 MMOL/L (ref 3.5–5.1)
POTASSIUM SERPL-SCNC: 2.9 MMOL/L (ref 3.5–5.1)
POTASSIUM SERPL-SCNC: 3.1 MMOL/L (ref 3.5–5.1)
POTASSIUM SERPL-SCNC: 3.2 MMOL/L (ref 3.5–5.1)
POTASSIUM SERPL-SCNC: 3.6 MMOL/L (ref 3.5–5.1)
POTASSIUM SERPL-SCNC: 3.6 MMOL/L (ref 3.5–5.1)
PROT SERPL-MCNC: 6.1 G/DL (ref 6–8.4)
RBC # BLD AUTO: 3.16 M/UL (ref 4–5.4)
SODIUM SERPL-SCNC: 138 MMOL/L (ref 136–145)
WBC # BLD AUTO: 10 K/UL (ref 3.9–12.7)

## 2022-01-15 PROCEDURE — C9113 INJ PANTOPRAZOLE SODIUM, VIA: HCPCS | Performed by: FAMILY MEDICINE

## 2022-01-15 PROCEDURE — 36415 COLL VENOUS BLD VENIPUNCTURE: CPT | Performed by: HOSPITALIST

## 2022-01-15 PROCEDURE — 85025 COMPLETE CBC W/AUTO DIFF WBC: CPT | Performed by: HOSPITALIST

## 2022-01-15 PROCEDURE — 25000003 PHARM REV CODE 250: Performed by: HOSPITALIST

## 2022-01-15 PROCEDURE — 25000003 PHARM REV CODE 250: Performed by: NURSE PRACTITIONER

## 2022-01-15 PROCEDURE — 20000000 HC ICU ROOM

## 2022-01-15 PROCEDURE — 80053 COMPREHEN METABOLIC PANEL: CPT | Performed by: NURSE PRACTITIONER

## 2022-01-15 PROCEDURE — 82330 ASSAY OF CALCIUM: CPT | Performed by: HOSPITALIST

## 2022-01-15 PROCEDURE — 63600175 PHARM REV CODE 636 W HCPCS: Performed by: HOSPITALIST

## 2022-01-15 PROCEDURE — 83735 ASSAY OF MAGNESIUM: CPT | Mod: 91 | Performed by: HOSPITALIST

## 2022-01-15 PROCEDURE — 99900031 HC PATIENT EDUCATION (STAT)

## 2022-01-15 PROCEDURE — 83735 ASSAY OF MAGNESIUM: CPT | Performed by: NURSE PRACTITIONER

## 2022-01-15 PROCEDURE — 25000003 PHARM REV CODE 250

## 2022-01-15 PROCEDURE — 84132 ASSAY OF SERUM POTASSIUM: CPT | Mod: 91 | Performed by: HOSPITALIST

## 2022-01-15 PROCEDURE — 63600175 PHARM REV CODE 636 W HCPCS: Performed by: FAMILY MEDICINE

## 2022-01-15 PROCEDURE — 99900035 HC TECH TIME PER 15 MIN (STAT)

## 2022-01-15 PROCEDURE — 63600175 PHARM REV CODE 636 W HCPCS: Performed by: NURSE PRACTITIONER

## 2022-01-15 PROCEDURE — 94761 N-INVAS EAR/PLS OXIMETRY MLT: CPT

## 2022-01-15 PROCEDURE — 25000003 PHARM REV CODE 250: Performed by: FAMILY MEDICINE

## 2022-01-15 PROCEDURE — 84100 ASSAY OF PHOSPHORUS: CPT | Performed by: HOSPITALIST

## 2022-01-15 RX ORDER — CALCIUM CARBONATE 200(500)MG
500 TABLET,CHEWABLE ORAL 3 TIMES DAILY PRN
Status: DISCONTINUED | OUTPATIENT
Start: 2022-01-15 | End: 2022-01-16 | Stop reason: HOSPADM

## 2022-01-15 RX ORDER — PANTOPRAZOLE SODIUM 40 MG/1
40 TABLET, DELAYED RELEASE ORAL
Status: DISCONTINUED | OUTPATIENT
Start: 2022-01-15 | End: 2022-01-16 | Stop reason: HOSPADM

## 2022-01-15 RX ORDER — PANTOPRAZOLE SODIUM 40 MG/10ML
40 INJECTION, POWDER, LYOPHILIZED, FOR SOLUTION INTRAVENOUS 2 TIMES DAILY
Status: DISCONTINUED | OUTPATIENT
Start: 2022-01-15 | End: 2022-01-15

## 2022-01-15 RX ADMIN — CALCIUM CARBONATE (ANTACID) CHEW TAB 500 MG 500 MG: 500 CHEW TAB at 04:01

## 2022-01-15 RX ADMIN — POTASSIUM CHLORIDE 20 MEQ: 7.46 INJECTION, SOLUTION INTRAVENOUS at 06:01

## 2022-01-15 RX ADMIN — POTASSIUM CHLORIDE 40 MEQ: 1500 TABLET, EXTENDED RELEASE ORAL at 09:01

## 2022-01-15 RX ADMIN — LEVOFLOXACIN 750 MG: 750 INJECTION, SOLUTION INTRAVENOUS at 07:01

## 2022-01-15 RX ADMIN — CALCIUM CHLORIDE 1 G: 100 INJECTION, SOLUTION INTRAVENOUS at 07:01

## 2022-01-15 RX ADMIN — POTASSIUM CHLORIDE 40 MEQ: 20 TABLET, EXTENDED RELEASE ORAL at 03:01

## 2022-01-15 RX ADMIN — MAGNESIUM SULFATE 2 G: 2 INJECTION INTRAVENOUS at 05:01

## 2022-01-15 RX ADMIN — SODIUM CHLORIDE: 0.9 INJECTION, SOLUTION INTRAVENOUS at 03:01

## 2022-01-15 RX ADMIN — CHLORHEXIDINE GLUCONATE 15 ML: 1.2 RINSE ORAL at 08:01

## 2022-01-15 RX ADMIN — FOLIC ACID 5 MG: 5 INJECTION, SOLUTION INTRAMUSCULAR; INTRAVENOUS; SUBCUTANEOUS at 09:01

## 2022-01-15 RX ADMIN — MORPHINE SULFATE 2 MG: 2 INJECTION, SOLUTION INTRAMUSCULAR; INTRAVENOUS at 06:01

## 2022-01-15 RX ADMIN — MAGNESIUM OXIDE 800 MG: 400 TABLET ORAL at 09:01

## 2022-01-15 RX ADMIN — THIAMINE HYDROCHLORIDE 300 MG: 100 INJECTION, SOLUTION INTRAMUSCULAR; INTRAVENOUS at 08:01

## 2022-01-15 RX ADMIN — POTASSIUM CHLORIDE 40 MEQ: 20 TABLET, EXTENDED RELEASE ORAL at 06:01

## 2022-01-15 RX ADMIN — MUPIROCIN: 20 OINTMENT TOPICAL at 08:01

## 2022-01-15 RX ADMIN — SODIUM PHOSPHATE, MONOBASIC, MONOHYDRATE AND SODIUM PHOSPHATE, DIBASIC, ANHYDROUS 20.01 MMOL: 276; 142 INJECTION, SOLUTION INTRAVENOUS at 09:01

## 2022-01-15 RX ADMIN — CALCIUM CARBONATE (ANTACID) CHEW TAB 500 MG 500 MG: 500 CHEW TAB at 08:01

## 2022-01-15 RX ADMIN — POTASSIUM CHLORIDE 40 MEQ: 20 TABLET, EXTENDED RELEASE ORAL at 09:01

## 2022-01-15 RX ADMIN — PANTOPRAZOLE SODIUM 40 MG: 40 INJECTION, POWDER, FOR SOLUTION INTRAVENOUS at 04:01

## 2022-01-15 RX ADMIN — PANTOPRAZOLE SODIUM 40 MG: 40 TABLET, DELAYED RELEASE ORAL at 08:01

## 2022-01-15 RX ADMIN — POTASSIUM CHLORIDE 20 MEQ: 7.46 INJECTION, SOLUTION INTRAVENOUS at 03:01

## 2022-01-15 RX ADMIN — LOSARTAN POTASSIUM 25 MG: 25 TABLET, FILM COATED ORAL at 09:01

## 2022-01-15 NOTE — CARE UPDATE
01/14/22 2038   PRE-TX-O2   O2 Device (Oxygen Therapy) room air   Pulse Oximetry Type Continuous   $ Pulse Oximetry - Multiple Charge Pulse Oximetry - Multiple   Education   $ Education 15 min   Respiratory Evaluation   $ Care Plan Tech Time 15 min

## 2022-01-15 NOTE — SUBJECTIVE & OBJECTIVE
Interval History:  Patient without acute events overnight  Patient tolerating diet without difficulty  No nausea no vomiting.  Abdominal pain is resolving.  No chest pain or shortness of breath  Objective:     Vital Signs (Most Recent):  Temp: 98.2 °F (36.8 °C) (01/15/22 0301)  Pulse: 97 (01/15/22 0600)  Resp: (!) 21 (01/15/22 0620)  BP: 115/65 (01/15/22 0600)  SpO2: 97 % (01/15/22 0600) Vital Signs (24h Range):  Temp:  [98 °F (36.7 °C)-98.3 °F (36.8 °C)] 98.2 °F (36.8 °C)  Pulse:  [81-99] 97  Resp:  [14-38] 21  SpO2:  [96 %-99 %] 97 %  BP: (115-160)/(56-98) 115/65     Weight: 83 kg (183 lb)  Body mass index is 32.42 kg/m².    Intake/Output Summary (Last 24 hours) at 1/15/2022 0810  Last data filed at 1/15/2022 0624  Gross per 24 hour   Intake 4864.16 ml   Output 7000 ml   Net -2135.84 ml      Physical Exam  Patient appears in no distress.  She is eating breakfast  HEENT sclerae nonicteric  Neck is supple nontender  Lungs clear to auscultation  Heart regular rate rhythm  Abdomen is soft nontender +BS  Extremities no edema no deformities  Neuro patient is alert oriented x3 motor exam is nonfocal  Psych calm cooperative  Significant Labs:   All pertinent labs within the past 24 hours have been reviewed.  BMP:   Recent Labs   Lab 01/15/22  0422   *      K 2.9*  2.9*   CL 98   CO2 28   BUN <5*   CREATININE 0.8   CALCIUM 8.0*   MG 1.5*     CBC:   Recent Labs   Lab 01/15/22  0425   WBC 10.00   HGB 9.5*   HCT 28.1*        CMP:   Recent Labs   Lab 01/13/22  1508 01/13/22  1802 01/14/22  0324 01/14/22  0915 01/14/22  2055 01/14/22  2154 01/15/22  0422     --  137  --   --   --  138   K 2.0*  2.0*   < > 2.4*  2.4*  2.4*   < > 2.6* 2.8* 2.9*  2.9*   CL 91*  --  96  --   --   --  98   CO2 34*  --  33*  --   --   --  28     --  90  --   --   --  153*   BUN 10  --  6*  --   --   --  <5*   CREATININE 1.1  --  1.0  --   --   --  0.8   CALCIUM 7.9*  --  8.4*  --   --   --  8.0*   PROT 6.2  --   6.4  --   --   --  6.1   ALBUMIN 2.8*  --  2.9*  --   --   --  2.8*   BILITOT 0.8  --  0.9  --   --   --  0.6   ALKPHOS 69  --  75  --   --   --  75   AST 54*  --  54*  --   --   --  33   ALT 23  --  28  --   --   --  25   ANIONGAP 12  --  8  --   --   --  12   EGFRNONAA 53.9*  --  >60.0  --   --   --  >60.0    < > = values in this interval not displayed.       Significant Imaging:

## 2022-01-15 NOTE — PROGRESS NOTES
[facility] Medicine  Progress Note    Patient Name: Claire Mcnamara  MRN: [id number]  Patient Class: IP- Inpatient   Admission Date: 1/12/2022  Length of Stay: 3 days  Attending Physician: Imtiaz Peacock DO  Primary Care Provider: Luis Manuel Montalvo DO        Subjective:     Principal Problem:<principal problem not specified>        HPI:  No notes on file    Overview/Hospital Course:  1/13 no acute events overnight  Patient remains in ICU replacing potassium and magnesium.  Patient seen by General surgery and GI  1/14 no acute events overnight  Continue to replace electrolytes  Case discussed with nurse.  Patient requesting to eat  1/15 no acute events overnight.  Potassium still low.  Patient requiring IV supplementation.  Case discussed with nurse.  Patient tolerating diet without difficulty.  Patient seen by surgery but no note available      Interval History:  Patient without acute events overnight  Patient tolerating diet without difficulty  No nausea no vomiting.  Abdominal pain is resolving.  No chest pain or shortness of breath  Objective:     Vital Signs (Most Recent):  Temp: 98.2 °F (36.8 °C) (01/15/22 0301)  Pulse: 97 (01/15/22 0600)  Resp: (!) 21 (01/15/22 0620)  BP: 115/65 (01/15/22 0600)  SpO2: 97 % (01/15/22 0600) Vital Signs (24h Range):  Temp:  [98 °F (36.7 °C)-98.3 °F (36.8 °C)] 98.2 °F (36.8 °C)  Pulse:  [81-99] 97  Resp:  [14-38] 21  SpO2:  [96 %-99 %] 97 %  BP: (115-160)/(56-98) 115/65     Weight: 83 kg (183 lb)  Body mass index is 32.42 kg/m².    Intake/Output Summary (Last 24 hours) at 1/15/2022 0810  Last data filed at 1/15/2022 0624  Gross per 24 hour   Intake 4864.16 ml   Output 7000 ml   Net -2135.84 ml      Physical Exam  Patient appears in no distress.  She is eating breakfast  HEENT sclerae nonicteric  Neck is supple nontender  Lungs clear to auscultation  Heart regular rate rhythm  Abdomen is soft nontender +BS  Extremities no edema no deformities  Neuro patient  is alert oriented x3 motor exam is nonfocal  Psych calm cooperative  Significant Labs:   All pertinent labs within the past 24 hours have been reviewed.  BMP:   Recent Labs   Lab 01/15/22  0422   *      K 2.9*  2.9*   CL 98   CO2 28   BUN <5*   CREATININE 0.8   CALCIUM 8.0*   MG 1.5*     CBC:   Recent Labs   Lab 01/15/22  0425   WBC 10.00   HGB 9.5*   HCT 28.1*        CMP:   Recent Labs   Lab 01/13/22  1508 01/13/22  1802 01/14/22  0324 01/14/22  0915 01/14/22  2055 01/14/22  2154 01/15/22  0422     --  137  --   --   --  138   K 2.0*  2.0*   < > 2.4*  2.4*  2.4*   < > 2.6* 2.8* 2.9*  2.9*   CL 91*  --  96  --   --   --  98   CO2 34*  --  33*  --   --   --  28     --  90  --   --   --  153*   BUN 10  --  6*  --   --   --  <5*   CREATININE 1.1  --  1.0  --   --   --  0.8   CALCIUM 7.9*  --  8.4*  --   --   --  8.0*   PROT 6.2  --  6.4  --   --   --  6.1   ALBUMIN 2.8*  --  2.9*  --   --   --  2.8*   BILITOT 0.8  --  0.9  --   --   --  0.6   ALKPHOS 69  --  75  --   --   --  75   AST 54*  --  54*  --   --   --  33   ALT 23  --  28  --   --   --  25   ANIONGAP 12  --  8  --   --   --  12   EGFRNONAA 53.9*  --  >60.0  --   --   --  >60.0    < > = values in this interval not displayed.       Significant Imaging:       Assessment/Plan:          #1 EtOH induced pancreatitis-  GI following, patient tolerating diet  Discontinue antibiotics.  Avoid all alcohol  #2 Severe hypokalemia-replacing.  Serial labs  Continue ICU monitoring  #3 Hypo magnesium-replacing  #4 EtOH use disorder ?  Dependence-monitoring  #5 Severe depression-  #6 Moderate protein calorie malnutrition      Case discussed with nurse  Overall improved.  Clinically it does not appear that patient has acute cholecystitis  Monitoring serial labs.  Patient requiring IV electrolyte replacement  VTE Risk Mitigation (From admission, onward)         Ordered     Place sequential compression device  Until discontinued          01/12/22 2203     IP VTE HIGH RISK PATIENT  Once         01/12/22 2109                Discharge Planning   YOVANNY: 1/16/2022     Code Status: Full Code   Is the patient medically ready for discharge?:     Reason for patient still in hospital (select all that apply): Patient trending condition  Discharge Plan A: Home                  Imtiaz Peacock DO  Department of Hospital Medicine   [facility]

## 2022-01-16 VITALS
DIASTOLIC BLOOD PRESSURE: 67 MMHG | BODY MASS INDEX: 32.43 KG/M2 | RESPIRATION RATE: 20 BRPM | HEART RATE: 95 BPM | HEIGHT: 63 IN | WEIGHT: 183 LBS | TEMPERATURE: 98 F | SYSTOLIC BLOOD PRESSURE: 123 MMHG | OXYGEN SATURATION: 99 %

## 2022-01-16 PROBLEM — K85.90 ACUTE PANCREATITIS: Status: ACTIVE | Noted: 2022-01-16

## 2022-01-16 LAB
ALBUMIN SERPL BCP-MCNC: 3 G/DL (ref 3.5–5.2)
ALP SERPL-CCNC: 76 U/L (ref 55–135)
ALT SERPL W/O P-5'-P-CCNC: 23 U/L (ref 10–44)
ANION GAP SERPL CALC-SCNC: 10 MMOL/L (ref 8–16)
AST SERPL-CCNC: 27 U/L (ref 10–40)
BILIRUB SERPL-MCNC: 0.5 MG/DL (ref 0.1–1)
BUN SERPL-MCNC: 7 MG/DL (ref 8–23)
CALCIUM SERPL-MCNC: 8.4 MG/DL (ref 8.7–10.5)
CHLORIDE SERPL-SCNC: 104 MMOL/L (ref 95–110)
CO2 SERPL-SCNC: 28 MMOL/L (ref 23–29)
CREAT SERPL-MCNC: 0.8 MG/DL (ref 0.5–1.4)
EST. GFR  (AFRICAN AMERICAN): >60 ML/MIN/1.73 M^2
EST. GFR  (NON AFRICAN AMERICAN): >60 ML/MIN/1.73 M^2
GLUCOSE SERPL-MCNC: 136 MG/DL (ref 70–110)
MAGNESIUM SERPL-MCNC: 1.5 MG/DL (ref 1.6–2.6)
MAGNESIUM SERPL-MCNC: 1.9 MG/DL (ref 1.6–2.6)
PHOSPHATE SERPL-MCNC: 4.3 MG/DL (ref 2.7–4.5)
POTASSIUM SERPL-SCNC: 3.5 MMOL/L (ref 3.5–5.1)
POTASSIUM SERPL-SCNC: 3.7 MMOL/L (ref 3.5–5.1)
PROT SERPL-MCNC: 6.4 G/DL (ref 6–8.4)
SODIUM SERPL-SCNC: 142 MMOL/L (ref 136–145)

## 2022-01-16 PROCEDURE — 84100 ASSAY OF PHOSPHORUS: CPT | Performed by: NURSE PRACTITIONER

## 2022-01-16 PROCEDURE — 25000003 PHARM REV CODE 250: Performed by: NURSE PRACTITIONER

## 2022-01-16 PROCEDURE — 36415 COLL VENOUS BLD VENIPUNCTURE: CPT | Performed by: HOSPITALIST

## 2022-01-16 PROCEDURE — 25000003 PHARM REV CODE 250

## 2022-01-16 PROCEDURE — 63600175 PHARM REV CODE 636 W HCPCS: Performed by: NURSE PRACTITIONER

## 2022-01-16 PROCEDURE — 83735 ASSAY OF MAGNESIUM: CPT | Performed by: NURSE PRACTITIONER

## 2022-01-16 PROCEDURE — 80053 COMPREHEN METABOLIC PANEL: CPT | Performed by: NURSE PRACTITIONER

## 2022-01-16 PROCEDURE — 25000003 PHARM REV CODE 250: Performed by: HOSPITALIST

## 2022-01-16 PROCEDURE — 84132 ASSAY OF SERUM POTASSIUM: CPT | Performed by: HOSPITALIST

## 2022-01-16 PROCEDURE — 36415 COLL VENOUS BLD VENIPUNCTURE: CPT | Performed by: NURSE PRACTITIONER

## 2022-01-16 PROCEDURE — 83735 ASSAY OF MAGNESIUM: CPT | Mod: 91 | Performed by: HOSPITALIST

## 2022-01-16 RX ORDER — POTASSIUM CHLORIDE 20 MEQ/1
40 TABLET, EXTENDED RELEASE ORAL DAILY
Qty: 28 TABLET | Refills: 0 | Status: SHIPPED | OUTPATIENT
Start: 2022-01-16 | End: 2022-01-30

## 2022-01-16 RX ORDER — POTASSIUM CHLORIDE 20 MEQ/1
40 TABLET, EXTENDED RELEASE ORAL ONCE
Status: DISCONTINUED | OUTPATIENT
Start: 2022-01-16 | End: 2022-01-16 | Stop reason: HOSPADM

## 2022-01-16 RX ADMIN — MAGNESIUM OXIDE 800 MG: 400 TABLET ORAL at 01:01

## 2022-01-16 RX ADMIN — LOSARTAN POTASSIUM 25 MG: 25 TABLET, FILM COATED ORAL at 08:01

## 2022-01-16 RX ADMIN — MAGNESIUM SULFATE 2 G: 2 INJECTION INTRAVENOUS at 05:01

## 2022-01-16 RX ADMIN — MAGNESIUM OXIDE 800 MG: 400 TABLET ORAL at 08:01

## 2022-01-16 RX ADMIN — CHLORHEXIDINE GLUCONATE 15 ML: 1.2 RINSE ORAL at 08:01

## 2022-01-16 RX ADMIN — POTASSIUM CHLORIDE 40 MEQ: 1500 TABLET, EXTENDED RELEASE ORAL at 05:01

## 2022-01-16 NOTE — DISCHARGE SUMMARY
[facility] Medicine  Discharge Summary      Patient Name: Claire Mcnamara  MRN: [id number]  Patient Class: IP- Inpatient  Admission Date: 1/12/2022  Hospital Length of Stay: 4 days  Discharge Date and Time:  01/16/2022 8:13 AM  Attending Physician: Imtiaz Peacock DO   Discharging Provider: Imtiaz Peacock DO  Primary Care Provider: Luis Manuel Montalvo DO      HPI:   No notes on file    * No surgery found *      Hospital Course:   1/13 no acute events overnight  Patient remains in ICU replacing potassium and magnesium.  Patient seen by General surgery and GI  1/14 no acute events overnight  Continue to replace electrolytes  Case discussed with nurse.  Patient requesting to eat  1/15 no acute events overnight.  Potassium still low.  Patient requiring IV supplementation.  Case discussed with nurse.  Patient tolerating diet without difficulty.  Patient seen by surgery but no note available  1/16 No acute events   Patient appears stable for discharge. Lytes much improved  Tolerating diet without problems   Instructed to go dominic AA and avoid all EtOH       Goals of Care Treatment Preferences:  Code Status: Full Code      Consults:   Consults (From admission, onward)        Status Ordering Provider     Inpatient consult to Registered Dietitian/Nutritionist  Once        Provider:  (Not yet assigned)    Completed DANNY ROBLEDO     Inpatient consult to Gastroenterology  Once        Provider:  Liang Giles III, MD    Completed LESLIE ROBERTSON     Inpatient consult to General surgery  Once        Provider:  Florentin Deras MD    Completed LESLIE ROBERTSON     Inpatient consult to Internal Medicine  Once        Provider:  LISA Freeman    Acknowledged LESLIE ROBERTSON        Discharge Diagnoses    #1 EtOH induced pancreatitis-  #2 Severe hypokalemia-   #3 Hypo magnesium  #4 EtOH use disorder ?  Dependence-  #5 Severe depression-  #6 Moderate protein calorie malnutrition     Final Active  Diagnoses:    Diagnosis Date Noted POA    PRINCIPAL PROBLEM:  Acute pancreatitis [K85.90] 01/16/2022 Unknown      Problems Resolved During this Admission:       Discharged Condition:   Patient appears no distress   abd soft NT  Neuro AAO x3  No nfocal   Heart RRR    Disposition: Home or Self Care    Follow Up:   Follow-up Information     Luis Manuel Montalvo DO In 3 days.    Specialty: Family Medicine  Contact information:  [address] [facility]  [address] [place] [address] [phone]                       Patient Instructions:      Diet Adult Regular     Activity as tolerated       Significant Diagnostic Studies: Labs:   BMP:   Recent Labs   Lab 01/15/22  0422 01/15/22  0422 01/15/22  0919 01/15/22  0919 01/15/22  1447 01/15/22  2005 01/16/22  0439   *  --   --   --   --   --  136*     --   --   --   --   --  142   K 2.9*  2.9*   < > 3.2*   < > 3.1* 3.6  3.6 3.5   CL 98  --   --   --   --   --  104   CO2 28  --   --   --   --   --  28   BUN <5*  --   --   --   --   --  7*   CREATININE 0.8  --   --   --   --   --  0.8   CALCIUM 8.0*  --   --   --   --   --  8.4*   MG 1.5*   < > 1.9  --   --  1.6 1.5*    < > = values in this interval not displayed.       Pending Diagnostic Studies:     Procedure Component Value Units Date/Time    Potassium [[id number]] Collected: 01/16/22 0749    Order Status: Sent Lab Status: In process Updated: 01/16/22 0810    Specimen: Blood     Potassium [[id number]] Collected: 01/13/22 2337    Order Status: Sent Lab Status: In process Updated: 01/13/22 2338    Specimen: Blood          Medications:  Reconciled Home Medications:      Medication List      START taking these medications    potassium chloride SA 20 MEQ tablet  Commonly known as: K-DUR,KLOR-CON  Take 2 tablets (40 mEq total) by mouth once daily. for 14 days        CONTINUE taking these medications    albuterol 90 mcg/actuation inhaler  Commonly known as: PROVENTIL/VENTOLIN HFA  Inhale 2 puffs into the lungs every 4 (four)  hours as needed.     amLODIPine 10 MG tablet  Commonly known as: NORVASC  Take 10 mg by mouth once daily.     FLUoxetine 40 MG capsule  Take 1 capsule by mouth once daily.     letrozole 2.5 mg Tab  Commonly known as: FEMARA  TAKE ONE TABLET BY MOUTH EVERY DAY.     losartan 25 MG tablet  Commonly known as: COZAAR  Take 25 mg by mouth once daily.     metFORMIN 1000 MG tablet  Commonly known as: GLUCOPHAGE  Take 1 tablet by mouth every evening.     montelukast 10 mg tablet  Commonly known as: SINGULAIR  Take 10 mg by mouth once daily.     omeprazole 40 MG capsule  Commonly known as: PRILOSEC  Take 1 capsule by mouth before breakfast.     rosuvastatin 10 MG tablet  Commonly known as: CRESTOR  Take 1 tablet by mouth once daily.     vitamin D 1000 units Tab  Commonly known as: VITAMIN D3  Take 1,000 Units by mouth once daily.        STOP taking these medications    ascorbic acid (vitamin C) 500 MG tablet  Commonly known as: VITAMIN C     diphenhydrAMINE 25 mg capsule  Commonly known as: BENADRYL     hydroCHLOROthiazide 25 MG tablet  Commonly known as: HYDRODIURIL     HYDROcodone-acetaminophen 5-325 mg per tablet  Commonly known as: NORCO     hydroquinone 4 % Crea     LORazepam 0.5 MG tablet  Commonly known as: ATIVAN     silver sulfADIAZINE 1% 1 % cream  Commonly known as: SILVADENE            Indwelling Lines/Drains at time of discharge:   Lines/Drains/Airways     None                 Time spent on the discharge of patient: 35 minutes         Imtiaz Peacock DO  Department of Hospital Medicine  [facility]

## 2022-01-16 NOTE — PLAN OF CARE
Chart reviewed no needs identified.     01/16/22 0833   Final Note   Assessment Type Final Discharge Note   Anticipated Discharge Disposition Home   What phone number can be called within the next 1-3 days to see how you are doing after discharge? [phone]   Post-Acute Status   Discharge Delays None known at this time

## 2022-01-16 NOTE — CARE UPDATE
01/15/22 2055   PRE-TX-O2   O2 Device (Oxygen Therapy) room air   Pulse Oximetry Type Continuous   $ Pulse Oximetry - Multiple Charge Pulse Oximetry - Multiple   Education   $ Education 15 min   Respiratory Evaluation   $ Care Plan Tech Time 15 min

## 2022-01-17 LAB
BACTERIA BLD CULT: NORMAL
BACTERIA BLD CULT: NORMAL

## 2022-11-04 DIAGNOSIS — Z17.0 MALIGNANT NEOPLASM OF CENTRAL PORTION OF RIGHT BREAST IN FEMALE, ESTROGEN RECEPTOR POSITIVE: ICD-10-CM

## 2022-11-04 DIAGNOSIS — C50.111 MALIGNANT NEOPLASM OF CENTRAL PORTION OF RIGHT BREAST IN FEMALE, ESTROGEN RECEPTOR POSITIVE: ICD-10-CM

## 2022-11-04 RX ORDER — LETROZOLE 2.5 MG/1
2.5 TABLET, FILM COATED ORAL DAILY
Qty: 30 TABLET | Refills: 5 | Status: SHIPPED | OUTPATIENT
Start: 2022-11-04 | End: 2024-02-06 | Stop reason: SDUPTHER

## 2022-12-30 ENCOUNTER — HOSPITAL ENCOUNTER (OUTPATIENT)
Dept: RADIOLOGY | Facility: HOSPITAL | Age: 62
Discharge: HOME | End: 2022-12-30
Attending: RADIOLOGY
Payer: MEDICAID

## 2022-12-30 VITALS — HEIGHT: 63 IN | WEIGHT: 183 LBS | BODY MASS INDEX: 32.43 KG/M2

## 2022-12-30 DIAGNOSIS — C50.111 MALIGNANT NEOPLASM OF CENTRAL PORTION OF RIGHT BREAST IN FEMALE, ESTROGEN RECEPTOR POSITIVE: ICD-10-CM

## 2022-12-30 DIAGNOSIS — Z17.0 MALIGNANT NEOPLASM OF CENTRAL PORTION OF RIGHT BREAST IN FEMALE, ESTROGEN RECEPTOR POSITIVE: ICD-10-CM

## 2022-12-30 PROCEDURE — 77066 DX MAMMO INCL CAD BI: CPT | Mod: TC,PO

## 2023-01-03 ENCOUNTER — HOSPITAL ENCOUNTER (EMERGENCY)
Facility: HOSPITAL | Age: 63
Discharge: HOME | End: 2023-01-03
Attending: EMERGENCY MEDICINE
Payer: MEDICAID

## 2023-01-03 VITALS
DIASTOLIC BLOOD PRESSURE: 100 MMHG | RESPIRATION RATE: 18 BRPM | HEART RATE: 74 BPM | BODY MASS INDEX: 32.43 KG/M2 | SYSTOLIC BLOOD PRESSURE: 177 MMHG | WEIGHT: 183 LBS | HEIGHT: 63 IN | OXYGEN SATURATION: 97 % | TEMPERATURE: 98 F

## 2023-01-03 DIAGNOSIS — R11.10 VOMITING: ICD-10-CM

## 2023-01-03 DIAGNOSIS — R10.33 PERIUMBILICAL ABDOMINAL PAIN: Primary | ICD-10-CM

## 2023-01-03 LAB
ALBUMIN SERPL BCP-MCNC: 4.4 G/DL (ref 3.5–5.2)
ALP SERPL-CCNC: 95 U/L (ref 55–135)
ALT SERPL W/O P-5'-P-CCNC: 30 U/L (ref 10–44)
ANION GAP SERPL CALC-SCNC: 10 MMOL/L (ref 8–16)
AST SERPL-CCNC: 35 U/L (ref 10–40)
BACTERIA #/AREA URNS HPF: NEGATIVE /HPF
BASOPHILS # BLD AUTO: 0.07 K/UL (ref 0–0.2)
BASOPHILS NFR BLD: 0.8 % (ref 0–1.9)
BILIRUB SERPL-MCNC: 0.7 MG/DL (ref 0.1–1)
BILIRUB UR QL STRIP: NEGATIVE
BUN SERPL-MCNC: 15 MG/DL (ref 8–23)
CALCIUM SERPL-MCNC: 9.5 MG/DL (ref 8.7–10.5)
CHLORIDE SERPL-SCNC: 100 MMOL/L (ref 95–110)
CLARITY UR: CLEAR
CO2 SERPL-SCNC: 26 MMOL/L (ref 23–29)
COLOR UR: YELLOW
CREAT SERPL-MCNC: 0.7 MG/DL (ref 0.5–1.4)
DIFFERENTIAL METHOD: NORMAL
EOSINOPHIL # BLD AUTO: 0.2 K/UL (ref 0–0.5)
EOSINOPHIL NFR BLD: 2.6 % (ref 0–8)
ERYTHROCYTE [DISTWIDTH] IN BLOOD BY AUTOMATED COUNT: 14.5 % (ref 11.5–14.5)
EST. GFR  (NO RACE VARIABLE): >60 ML/MIN/1.73 M^2
GLUCOSE SERPL-MCNC: 105 MG/DL (ref 70–110)
GLUCOSE UR QL STRIP: NEGATIVE
HCT VFR BLD AUTO: 40.1 % (ref 37–48.5)
HGB BLD-MCNC: 13.6 G/DL (ref 12–16)
HGB UR QL STRIP: NEGATIVE
HYALINE CASTS #/AREA URNS LPF: 2 /LPF
IMM GRANULOCYTES # BLD AUTO: 0.02 K/UL (ref 0–0.04)
IMM GRANULOCYTES NFR BLD AUTO: 0.2 % (ref 0–0.5)
KETONES UR QL STRIP: NEGATIVE
LEUKOCYTE ESTERASE UR QL STRIP: NEGATIVE
LIPASE SERPL-CCNC: 26 U/L (ref 4–60)
LYMPHOCYTES # BLD AUTO: 2.2 K/UL (ref 1–4.8)
LYMPHOCYTES NFR BLD: 25.6 % (ref 18–48)
MCH RBC QN AUTO: 29.4 PG (ref 27–31)
MCHC RBC AUTO-ENTMCNC: 33.9 G/DL (ref 32–36)
MCV RBC AUTO: 87 FL (ref 82–98)
MICROSCOPIC COMMENT: ABNORMAL
MONOCYTES # BLD AUTO: 0.7 K/UL (ref 0.3–1)
MONOCYTES NFR BLD: 8.5 % (ref 4–15)
NEUTROPHILS # BLD AUTO: 5.5 K/UL (ref 1.8–7.7)
NEUTROPHILS NFR BLD: 62.3 % (ref 38–73)
NITRITE UR QL STRIP: NEGATIVE
NRBC BLD-RTO: 0 /100 WBC
PH UR STRIP: 7 [PH] (ref 5–8)
PLATELET # BLD AUTO: 246 K/UL (ref 150–450)
PMV BLD AUTO: 10.7 FL (ref 9.2–12.9)
POTASSIUM SERPL-SCNC: 3.2 MMOL/L (ref 3.5–5.1)
PROT SERPL-MCNC: 8 G/DL (ref 6–8.4)
PROT UR QL STRIP: ABNORMAL
RBC # BLD AUTO: 4.62 M/UL (ref 4–5.4)
RBC #/AREA URNS HPF: 1 /HPF (ref 0–4)
SODIUM SERPL-SCNC: 136 MMOL/L (ref 136–145)
SP GR UR STRIP: >1.03 (ref 1–1.03)
SQUAMOUS #/AREA URNS HPF: 2 /HPF
TROPONIN I SERPL HS-MCNC: 2.4 PG/ML (ref 0–14.9)
URN SPEC COLLECT METH UR: ABNORMAL
UROBILINOGEN UR STRIP-ACNC: ABNORMAL EU/DL
WBC # BLD AUTO: 8.75 K/UL (ref 3.9–12.7)
WBC #/AREA URNS HPF: 1 /HPF (ref 0–5)

## 2023-01-03 PROCEDURE — 63600175 PHARM REV CODE 636 W HCPCS: Performed by: EMERGENCY MEDICINE

## 2023-01-03 PROCEDURE — 99285 EMERGENCY DEPT VISIT HI MDM: CPT | Mod: 25

## 2023-01-03 PROCEDURE — 96361 HYDRATE IV INFUSION ADD-ON: CPT

## 2023-01-03 PROCEDURE — 80053 COMPREHEN METABOLIC PANEL: CPT | Performed by: NURSE PRACTITIONER

## 2023-01-03 PROCEDURE — 81001 URINALYSIS AUTO W/SCOPE: CPT | Performed by: NURSE PRACTITIONER

## 2023-01-03 PROCEDURE — 93010 EKG 12-LEAD: ICD-10-PCS | Mod: ,,, | Performed by: INTERNAL MEDICINE

## 2023-01-03 PROCEDURE — 25500020 PHARM REV CODE 255: Performed by: EMERGENCY MEDICINE

## 2023-01-03 PROCEDURE — 25000003 PHARM REV CODE 250: Performed by: EMERGENCY MEDICINE

## 2023-01-03 PROCEDURE — 96374 THER/PROPH/DIAG INJ IV PUSH: CPT | Mod: 59

## 2023-01-03 PROCEDURE — 83690 ASSAY OF LIPASE: CPT | Performed by: NURSE PRACTITIONER

## 2023-01-03 PROCEDURE — 36415 COLL VENOUS BLD VENIPUNCTURE: CPT | Performed by: NURSE PRACTITIONER

## 2023-01-03 PROCEDURE — 93005 ELECTROCARDIOGRAM TRACING: CPT | Performed by: INTERNAL MEDICINE

## 2023-01-03 PROCEDURE — 93010 ELECTROCARDIOGRAM REPORT: CPT | Mod: ,,, | Performed by: INTERNAL MEDICINE

## 2023-01-03 PROCEDURE — 85025 COMPLETE CBC W/AUTO DIFF WBC: CPT | Performed by: NURSE PRACTITIONER

## 2023-01-03 PROCEDURE — 84484 ASSAY OF TROPONIN QUANT: CPT | Performed by: NURSE PRACTITIONER

## 2023-01-03 PROCEDURE — 96375 TX/PRO/DX INJ NEW DRUG ADDON: CPT

## 2023-01-03 RX ORDER — ONDANSETRON 4 MG/1
4 TABLET, FILM COATED ORAL EVERY 8 HOURS PRN
Qty: 15 TABLET | Refills: 0 | Status: SHIPPED | OUTPATIENT
Start: 2023-01-03

## 2023-01-03 RX ORDER — DICYCLOMINE HYDROCHLORIDE 20 MG/1
20 TABLET ORAL 2 TIMES DAILY PRN
Qty: 20 TABLET | Refills: 0 | Status: SHIPPED | OUTPATIENT
Start: 2023-01-03 | End: 2023-01-13

## 2023-01-03 RX ORDER — ONDANSETRON 2 MG/ML
4 INJECTION INTRAMUSCULAR; INTRAVENOUS
Status: COMPLETED | OUTPATIENT
Start: 2023-01-03 | End: 2023-01-03

## 2023-01-03 RX ORDER — MORPHINE SULFATE 4 MG/ML
4 INJECTION, SOLUTION INTRAMUSCULAR; INTRAVENOUS
Status: COMPLETED | OUTPATIENT
Start: 2023-01-03 | End: 2023-01-03

## 2023-01-03 RX ORDER — POTASSIUM CHLORIDE 20 MEQ/1
40 TABLET, EXTENDED RELEASE ORAL ONCE
Status: COMPLETED | OUTPATIENT
Start: 2023-01-03 | End: 2023-01-03

## 2023-01-03 RX ADMIN — ONDANSETRON HYDROCHLORIDE 4 MG: 2 SOLUTION INTRAMUSCULAR; INTRAVENOUS at 05:01

## 2023-01-03 RX ADMIN — MORPHINE SULFATE 4 MG: 4 INJECTION, SOLUTION INTRAMUSCULAR; INTRAVENOUS at 05:01

## 2023-01-03 RX ADMIN — SODIUM CHLORIDE 1000 ML: 0.9 INJECTION, SOLUTION INTRAVENOUS at 04:01

## 2023-01-03 RX ADMIN — POTASSIUM CHLORIDE 40 MEQ: 1500 TABLET, EXTENDED RELEASE ORAL at 05:01

## 2023-01-03 RX ADMIN — IOHEXOL 100 ML: 350 INJECTION, SOLUTION INTRAVENOUS at 03:01

## 2023-01-03 NOTE — DISCHARGE INSTRUCTIONS
RETURN TO EMERGENCY DEPARTMENT WITHOUT FAIL, IF YOUR SYMPTOMS WORSEN, IF YOU GET NEW OR DIFFERENT SYMPTOMS, IF YOU ARE UNABLE TO FOLLOW UP AS DIRECTED, OR IF YOU HAVE ANY CONCERNS OR WORRIES.    Take medication as directed.  Follow-up with primary care provider.

## 2023-01-03 NOTE — ED PROVIDER NOTES
Encounter Date: 1/3/2023       History     Chief Complaint   Patient presents with    Abdominal Pain    Vomiting     Abd pain around umbilicus and vomiting since drinking 6 glasses of wine on 12/31      62-year-old female with past medical history of hypertension, hyperlipidemia, diabetes, anxiety, history of pancreatitis, presents emergency department with periumbilical pain, nausea.  She says it started since new year's Rhonda and she is had pain ever since.  She says he drank 6 wine lasted and a Markus Caceres drink and ever since she is had pain.  She is had some associated nausea and pain that is not going away.  No vomiting.  No diarrhea.  She says she is chronically constipated.  She does not have any associated chest pain or any shortness of breath.  She does not have any urinary complaints such as frequency urgency or burning.  No CVA tenderness or pain reported.    Review of patient's allergies indicates:   Allergen Reactions    Amoxicillin Nausea Only and Other (See Comments)     Past Medical History:   Diagnosis Date    Anxiety     Asthma     occ problems    Breast cancer     right    Cancer 11/2019    right breast- surg scheduled    Constipation     Depression     Diabetes mellitus, type 2     GERD (gastroesophageal reflux disease)     Hyperlipidemia     Hypertension     x yrs    Prediabetes      Past Surgical History:   Procedure Laterality Date    BREAST BIOPSY Right 11/2019    BREAST LUMPECTOMY      SENTINEL LYMPH NODE BIOPSY Right 12/26/2019    Procedure: BIOPSY, LYMPH NODE, SENTINEL;  Surgeon: Luis Manuel Rios MD;  Location: [facility];  Service: General;  Laterality: Right;  SENTINEL @ 9A    TUBAL LIGATION  1939    VAGINAL DELIVERY      x 2     Family History   Problem Relation Age of Onset    Pancreatic cancer Mother     Breast cancer Sister     Breast cancer Maternal Aunt 60    Breast cancer Other      Social History     Tobacco Use    Smoking status: Some Days     Packs/day: 0.25     Types: Cigarettes     Smokeless tobacco: Never    Tobacco comments:     trying   Substance Use Topics    Alcohol use: Yes     Comment: Socially     Drug use: Yes     Types: Marijuana     Comment: occ     Review of Systems   Constitutional:  Negative for chills and fever.   HENT:  Negative for sore throat.    Respiratory:  Negative for shortness of breath.    Cardiovascular:  Negative for chest pain.   Gastrointestinal:  Positive for abdominal pain and nausea. Negative for vomiting.   Genitourinary:  Negative for dysuria.   Musculoskeletal:  Negative for back pain.   Skin:  Negative for rash.   Neurological:  Negative for weakness and headaches.   Hematological:  Does not bruise/bleed easily.   All other systems reviewed and are negative.    Physical Exam     Initial Vitals [01/03/23 1240]   BP Pulse Resp Temp SpO2   (!) 161/88 92 18 98 °F (36.7 °C) 98 %      MAP       --         Physical Exam    Nursing note and vitals reviewed.  Constitutional: She appears well-developed and well-nourished. She is Obese .   HENT:   Head: Normocephalic and atraumatic.   Mouth/Throat: No oropharyngeal exudate.   Eyes: Conjunctivae and EOM are normal. Pupils are equal, round, and reactive to light.   Neck: Neck supple. No tracheal deviation present.   Normal range of motion.  Cardiovascular:  Normal rate, regular rhythm, normal heart sounds and intact distal pulses.           No murmur heard.  Pulmonary/Chest: Breath sounds normal. No stridor. No respiratory distress. She has no wheezes. She has no rhonchi. She has no rales.   Abdominal: Abdomen is soft. She exhibits no distension. There is no abdominal tenderness.   Mildly decreased bowel sounds mild periumbilical tenderness and epigastric tenderness to palpation. There is no rebound and no guarding.   Musculoskeletal:         General: No tenderness or edema. Normal range of motion.      Cervical back: Normal range of motion and neck supple.     Lymphadenopathy:     She has no cervical adenopathy.    Neurological: She is alert and oriented to person, place, and time. She has normal strength. No cranial nerve deficit or sensory deficit. GCS score is 15. GCS eye subscore is 4. GCS verbal subscore is 5. GCS motor subscore is 6.   Skin: Skin is warm and dry. Capillary refill takes less than 2 seconds. No rash noted. No erythema. No pallor.   Psychiatric: She has a normal mood and affect. Her behavior is normal. Judgment and thought content normal.       ED Course   Procedures  Labs Reviewed   COMPREHENSIVE METABOLIC PANEL - Abnormal; Notable for the following components:       Result Value    Potassium 3.2 (*)     All other components within normal limits    Narrative:     For upper or mid abdominal pain.   URINALYSIS, REFLEX TO URINE CULTURE - Abnormal; Notable for the following components:    Specific Gravity, UA >1.030 (*)     Protein, UA 1+ (*)     Urobilinogen, UA 2.0-3.0 (*)     All other components within normal limits    Narrative:     In and Out Cath as needed it patient unable to void  Specimen Source->Urine   URINALYSIS MICROSCOPIC - Abnormal; Notable for the following components:    Hyaline Casts, UA 2 (*)     All other components within normal limits    Narrative:     In and Out Cath as needed it patient unable to void  Specimen Source->Urine   CBC W/ AUTO DIFFERENTIAL    Narrative:     For upper or mid abdominal pain.   LIPASE    Narrative:     For upper or mid abdominal pain.   TROPONIN I HIGH SENSITIVITY   TROPONIN I HIGH SENSITIVITY        ECG Results              EKG 12-lead (In process)  Result time 01/03/23 14:10:36      In process by Interface, Lab In [facility] (01/03/23 14:10:36)                   Narrative:    Test Reason : R11.10,    Vent. Rate : 073 BPM     Atrial Rate : 073 BPM     P-R Int : 166 ms          QRS Dur : 076 ms      QT Int : 438 ms       P-R-T Axes : 069 076 076 degrees     QTc Int : 482 ms    Normal sinus rhythm  Normal ECG  When compared with ECG of 12-JAN-2022  19:50,  Premature supraventricular complexes are no longer Present  ST no longer depressed in Anterior leads    Referred By: [email or web address]   SELF           Confirmed By:                                   Results for orders placed or performed during the hospital encounter of 01/03/23   CBC auto differential   Result Value Ref Range    WBC 8.75 3.90 - 12.70 K/uL    RBC 4.62 4.00 - 5.40 M/uL    Hemoglobin 13.6 12.0 - 16.0 g/dL    Hematocrit 40.1 37.0 - 48.5 %    MCV 87 82 - 98 fL    MCH 29.4 27.0 - 31.0 pg    MCHC 33.9 32.0 - 36.0 g/dL    RDW 14.5 11.5 - 14.5 %    Platelets 246 150 - 450 K/uL    MPV 10.7 9.2 - 12.9 fL    Immature Granulocytes 0.2 0.0 - 0.5 %    Gran # (ANC) 5.5 1.8 - 7.7 K/uL    Immature Grans (Abs) 0.02 0.00 - 0.04 K/uL    Lymph # 2.2 1.0 - 4.8 K/uL    Mono # 0.7 0.3 - 1.0 K/uL    Eos # 0.2 0.0 - 0.5 K/uL    Baso # 0.07 0.00 - 0.20 K/uL    nRBC 0 0 /100 WBC    Gran % 62.3 38.0 - 73.0 %    Lymph % 25.6 18.0 - 48.0 %    Mono % 8.5 4.0 - 15.0 %    Eosinophil % 2.6 0.0 - 8.0 %    Basophil % 0.8 0.0 - 1.9 %    Differential Method Automated    Comprehensive metabolic panel   Result Value Ref Range    Sodium 136 136 - 145 mmol/L    Potassium 3.2 (L) 3.5 - 5.1 mmol/L    Chloride 100 95 - 110 mmol/L    CO2 26 23 - 29 mmol/L    Glucose 105 70 - 110 mg/dL    BUN 15 8 - 23 mg/dL    Creatinine 0.7 0.5 - 1.4 mg/dL    Calcium 9.5 8.7 - 10.5 mg/dL    Total Protein 8.0 6.0 - 8.4 g/dL    Albumin 4.4 3.5 - 5.2 g/dL    Total Bilirubin 0.7 0.1 - 1.0 mg/dL    Alkaline Phosphatase 95 55 - 135 U/L    AST 35 10 - 40 U/L    ALT 30 10 - 44 U/L    Anion Gap 10 8 - 16 mmol/L    eGFR >60.0 >60 mL/min/1.73 m^2   Urinalysis, Reflex to Urine Culture Urine, Clean Catch    Specimen: Urine   Result Value Ref Range    Specimen UA Urine, Clean Catch     Color, UA Yellow Yellow, Straw, [place]    Appearance, UA Clear Clear    pH, UA 7.0 5.0 - 8.0    Specific Gravity, UA >1.030 (A) 1.005 - 1.030    Protein, UA 1+ (A) Negative    Glucose, UA  Negative Negative    Ketones, UA Negative Negative    Bilirubin (UA) Negative Negative    Occult Blood UA Negative Negative    Nitrite, UA Negative Negative    Urobilinogen, UA 2.0-3.0 (A) Negative EU/dL    Leukocytes, UA Negative Negative   Lipase   Result Value Ref Range    Lipase 26 4 - 60 U/L   TROPONIN I HIGH SENSITIVITY   Result Value Ref Range    Troponin I High Sensitivity 2.4 0.0 - 14.9 pg/mL   Urinalysis Microscopic   Result Value Ref Range    RBC, UA 1 0 - 4 /hpf    WBC, UA 1 0 - 5 /hpf    Bacteria Negative None-Occ /hpf    Squam Epithel, UA 2 /hpf    Hyaline Casts, UA 2 (A) 0-1/lpf /lpf    Microscopic Comment SEE COMMENT        Imaging Results              CT Abdomen Pelvis With Contrast (Final result)  Result time 01/03/23 15:27:48      Final result by Samantha Castillo MD (01/03/23 15:27:48)                   Narrative:    All CT scans at this facility used dose modulation, iterative reconstruction and/or weight-based dosing when appropriate to reduce radiation doses  as low as reasonably achievable.    HISTORY: Nausea/vomiting; Epigastric pain    FINDINGS: Axial postcontrast imaging was performed with 100 mL Omnipaque 350 IV contrast .    COMPARISON: 1/12/2022    CT ABDOMEN: The lung bases are clear.    The liver, spleen and pancreas have a normal noncontrast appearance. There is no biliary duct dilatation.  Gallbladder and adrenal glands are normal    The kidneys enhance symmetrically without hydronephrosis or calculi. There are bilateral renal cysts.    There are no thick-walled or dilated bowel loops. There is colonic diverticulosis without diverticulitis. The appendix is normal.    There is no mesenteric, retroperitoneal or pelvic adenopathy.  The aorta is normal in caliber with diffuse vascular calcification. The musculature is normal.    There are degenerative changes at L5-S1. There are no acute osseous abnormalities.    CT PELVIS: There is sigmoid diverticulosis without diverticulitis. The  uterus, ovaries and bladder are normal. There is no free fluid.    IMPRESSION: Colonic diverticulosis without diverticulitis    No evidence bowel obstruction    Bilateral renal cysts    Electronically signed by:  Samantha Castillo MD  1/3/2023 3:27 PM CST Workstation: [id number]                                     Medications   sodium chloride 0.9% bolus 1,000 mL 1,000 mL (1,000 mLs Intravenous New Bag 1/3/23 1631)   morphine injection 4 mg (4 mg Intravenous Given 1/3/23 1701)   ondansetron injection 4 mg (4 mg Intravenous Given 1/3/23 1701)   iohexoL (OMNIPAQUE 350) injection 100 mL (100 mLs Intravenous Given 1/3/23 1512)   potassium chloride SA CR tablet 40 mEq (40 mEq Oral Given 1/3/23 1706)     Medical Decision Making:   Initial Assessment:   Emergent evaluation of 62-year-old female presents emergency department with abdominal pain for the last 4 days and nausea.  She is well-appearing, nontoxic no distress.  Initial differential includes but not limited to cholecystitis, pancreatitis, bowel obstruction urinary tract infection, pyelonephritis, electrolyte acute coronary syndrome, etc.  Clinical Tests:   Lab Tests: Ordered and Reviewed  Radiological Study: Ordered and Reviewed  Medical Tests: Reviewed and Ordered  ED Management:  62-year-old female presents emergency department periumbilical abdominal pain as described above.  She is well-appearing, nontoxic no distress.  She has received IV Zofran IV morphine emergency department is helped somewhat with the pain.  She is also received IV fluids in the emergency department.  She is feeling somewhat better on repeat assessment.  Her labs are unremarkable.  Her lipase is normal her CBC is normal her CMP is relatively within normal limits, less mild hypokalemia which was repleted with oral potassium in the emergency department.  Patient had a CT scan emergency department which did not show any evidence of any acute pathology, urinalysis does not show any evidence  of any urinary tract infection.  She is feeling better.  Is improved.  Will give her Zofran and Bentyl upon discharge.  She will follow up with her primary care provider.    A dictation software program was used for this note.  Please expect some simple typographical  errors in this note.    I had a detailed discussion with the patient and/or guardian regarding: The historical points, exam findings, and diagnostic results supporting the discharge diagnosis, lab results, pertinent radiology results, and the need for outpatient follow-up, for definitive care with a family practitioner and to return to the emergency department if symptoms worsen or persist or if there are any questions or concerns that arise at home. All questions have been answered in detail. Strict return to Emergency Department precautions have been provided.              ED Course as of 01/03/23 1709   Tue Jan 03, 2023   1412 EKG 1:55 p.m. normal sinus rhythm rate of 73.  No STEMI [JR]      ED Course User Index  [JR] Valentin Douglas DO                 Clinical Impression:   Final diagnoses:  [R11.10] Vomiting  [R10.33] Periumbilical abdominal pain (Primary)        ED Disposition Condition    Discharge Stable          ED Prescriptions       Medication Sig Dispense Start Date End Date Auth. Provider    dicyclomine (BENTYL) 20 mg tablet Take 1 tablet (20 mg total) by mouth 2 (two) times daily as needed. 20 tablet 1/3/2023 1/13/2023 Valentin Douglas DO    ondansetron (ZOFRAN) 4 MG tablet Take 1 tablet (4 mg total) by mouth every 8 (eight) hours as needed for Nausea. 15 tablet 1/3/2023 -- Valentin Douglas DO          Follow-up Information       Follow up With Specialties Details Why Contact Info Additional Information    Luis Manuel Montalvo, DO Family Medicine In 3 days  [address]  [address]  [facility] [address]  [phone]       Count includes the [facility] - Emergency Dept Emergency Medicine  If symptoms worsen [address] [facility]  [address]  [phone] 1st floor             Valentin Douglas,   01/03/23 1710

## 2023-12-26 DIAGNOSIS — Z12.31 ENCOUNTER FOR SCREENING MAMMOGRAM FOR MALIGNANT NEOPLASM OF BREAST: Primary | ICD-10-CM

## 2024-02-01 ENCOUNTER — HOSPITAL ENCOUNTER (OUTPATIENT)
Dept: RADIOLOGY | Facility: HOSPITAL | Age: 64
Discharge: HOME | End: 2024-02-01
Payer: MEDICAID

## 2024-02-01 VITALS — BODY MASS INDEX: 32.43 KG/M2 | WEIGHT: 183 LBS | HEIGHT: 63 IN

## 2024-02-01 DIAGNOSIS — Z12.31 ENCOUNTER FOR SCREENING MAMMOGRAM FOR MALIGNANT NEOPLASM OF BREAST: ICD-10-CM

## 2024-02-01 PROCEDURE — 77067 SCR MAMMO BI INCL CAD: CPT | Mod: TC,PO

## 2024-02-06 DIAGNOSIS — C50.111 MALIGNANT NEOPLASM OF CENTRAL PORTION OF RIGHT BREAST IN FEMALE, ESTROGEN RECEPTOR POSITIVE: ICD-10-CM

## 2024-02-06 DIAGNOSIS — Z17.0 MALIGNANT NEOPLASM OF CENTRAL PORTION OF RIGHT BREAST IN FEMALE, ESTROGEN RECEPTOR POSITIVE: ICD-10-CM

## 2024-02-06 RX ORDER — LETROZOLE 2.5 MG/1
2.5 TABLET, FILM COATED ORAL DAILY
Qty: 30 TABLET | Refills: 5 | Status: SHIPPED | OUTPATIENT
Start: 2024-02-06

## 2025-01-30 DIAGNOSIS — Z12.31 OTHER SCREENING MAMMOGRAM: Primary | ICD-10-CM

## (undated) DEVICE — PAD BOVIE ADULT

## (undated) DEVICE — NEEDLE SAFETY ECLIPSE 25G 1-1/2IN 305767

## (undated) DEVICE — DRAPE LAPAROTOMY TRANSVERSE 89281

## (undated) DEVICE — SUTURE SILK 3-0 18 A184H

## (undated) DEVICE — GLOVE BIOGEL PI ULTRA TOUCH G GRAY SZ 7

## (undated) DEVICE — TRAY GENERAL SURGERY

## (undated) DEVICE — DISSECTOR EXACT LIGASURE 20.6MM-21CM

## (undated) DEVICE — SOLUTION IRRI NS BOTTLE 1000ML R5200-01

## (undated) DEVICE — SUTURE SILK 2-0 SH 30 K833H

## (undated) DEVICE — SUTURE SILK 2-0 18 A185H

## (undated) DEVICE — DRAPE NAVIGATOR GAMMA PROBE 098004

## (undated) DEVICE — SPONGE PEANUT 3/8 7103

## (undated) DEVICE — DRESSING POST OP MEPILEX AG 4X6  498300

## (undated) DEVICE — TIP BOVIE ENT 2.75      E1455

## (undated) DEVICE — COVER LIGHT HANDLE LB53

## (undated) DEVICE — GLOVE BIOGELPI GOLD SIZE 7.5

## (undated) DEVICE — SUTURE MONOCRYL 4-0 27 SH MCP415H

## (undated) DEVICE — STERISTRIP 1/4 SKIN CLOSURE

## (undated) DEVICE — GLOVE BIOGEL PI ORTHO PRO BROWN SZ 7

## (undated) DEVICE — SWABSTICK BENZOIN S42450

## (undated) DEVICE — UNDERGLOVE BIOGEL PI MICRO BLUE SZ 7

## (undated) DEVICE — SUTURE MONOCRYL 4-0 PS-2 27 MCP426H

## (undated) DEVICE — SYRINGE SAFETY 1ML TB 27GA X 1/2 305789